# Patient Record
Sex: MALE | Race: WHITE | NOT HISPANIC OR LATINO
[De-identification: names, ages, dates, MRNs, and addresses within clinical notes are randomized per-mention and may not be internally consistent; named-entity substitution may affect disease eponyms.]

---

## 2019-11-20 ENCOUNTER — APPOINTMENT (OUTPATIENT)
Dept: UROLOGY | Facility: CLINIC | Age: 69
End: 2019-11-20
Payer: MEDICARE

## 2019-11-20 VITALS
RESPIRATION RATE: 16 BRPM | WEIGHT: 160 LBS | SYSTOLIC BLOOD PRESSURE: 130 MMHG | BODY MASS INDEX: 22.9 KG/M2 | HEIGHT: 70 IN | HEART RATE: 64 BPM | DIASTOLIC BLOOD PRESSURE: 77 MMHG

## 2019-11-20 DIAGNOSIS — I25.2 OLD MYOCARDIAL INFARCTION: ICD-10-CM

## 2019-11-20 DIAGNOSIS — Z78.9 OTHER SPECIFIED HEALTH STATUS: ICD-10-CM

## 2019-11-20 DIAGNOSIS — Z80.3 FAMILY HISTORY OF MALIGNANT NEOPLASM OF BREAST: ICD-10-CM

## 2019-11-20 PROCEDURE — 99203 OFFICE O/P NEW LOW 30 MIN: CPT

## 2019-11-20 RX ORDER — RAMIPRIL 5 MG/1
CAPSULE ORAL
Refills: 0 | Status: ACTIVE | COMMUNITY

## 2019-11-20 RX ORDER — OMEGA-3/DHA/EPA/FISH OIL 300-1000MG
CAPSULE ORAL
Refills: 0 | Status: ACTIVE | COMMUNITY

## 2019-11-20 RX ORDER — ATORVASTATIN CALCIUM 80 MG/1
TABLET, FILM COATED ORAL
Refills: 0 | Status: ACTIVE | COMMUNITY

## 2019-11-20 RX ORDER — METOPROLOL SUCCINATE 50 MG/1
50 TABLET, EXTENDED RELEASE ORAL
Refills: 0 | Status: ACTIVE | COMMUNITY

## 2019-11-20 RX ORDER — TICAGRELOR 90 MG/1
90 TABLET ORAL
Refills: 0 | Status: ACTIVE | COMMUNITY

## 2019-11-20 NOTE — REVIEW OF SYSTEMS
[Easy Bleeding] : a tendency for easy bleeding [Chest Pain] : chest pain [Negative] : Endocrine [Seen by urologist before (Name)  ___] : Preciously seen by a urologist: [unfilled] [Pain during urination] : pain during urination [History of kidney stones] : history of kidney stones [Blood in urine that you can see] : blood visible in urine [Interrupted urine stream] : interrupted urine stream [Slow urine stream] : slow urine stream [Wake up at night to urinate  How many times?  ___] : wakes up to urinate [unfilled] times during the night

## 2019-11-20 NOTE — PHYSICAL EXAM
[General Appearance - Well Developed] : well developed [Well Groomed] : well groomed [Normal Appearance] : normal appearance [General Appearance - Well Nourished] : well nourished [General Appearance - In No Acute Distress] : no acute distress [Exaggerated Use Of Accessory Muscles For Inspiration] : no accessory muscle use [Respiration, Rhythm And Depth] : normal respiratory rhythm and effort [] : no rash [Normal Station and Gait] : the gait and station were normal for the patient's age [No Focal Deficits] : no focal deficits [Oriented To Time, Place, And Person] : oriented to person, place, and time [Affect] : the affect was normal [Mood] : the mood was normal [Not Anxious] : not anxious

## 2019-11-21 LAB — BACTERIA UR CULT: NORMAL

## 2019-11-25 ENCOUNTER — OUTPATIENT (OUTPATIENT)
Dept: OUTPATIENT SERVICES | Facility: HOSPITAL | Age: 69
LOS: 1 days | End: 2019-11-25
Payer: MEDICARE

## 2019-11-25 ENCOUNTER — TRANSCRIPTION ENCOUNTER (OUTPATIENT)
Age: 69
End: 2019-11-25

## 2019-11-25 VITALS
WEIGHT: 160.06 LBS | TEMPERATURE: 97 F | RESPIRATION RATE: 16 BRPM | HEIGHT: 69 IN | OXYGEN SATURATION: 99 % | SYSTOLIC BLOOD PRESSURE: 138 MMHG | HEART RATE: 68 BPM | DIASTOLIC BLOOD PRESSURE: 82 MMHG

## 2019-11-25 DIAGNOSIS — Z98.1 ARTHRODESIS STATUS: Chronic | ICD-10-CM

## 2019-11-25 DIAGNOSIS — N20.1 CALCULUS OF URETER: ICD-10-CM

## 2019-11-25 DIAGNOSIS — N20.0 CALCULUS OF KIDNEY: ICD-10-CM

## 2019-11-25 DIAGNOSIS — I25.10 ATHEROSCLEROTIC HEART DISEASE OF NATIVE CORONARY ARTERY WITHOUT ANGINA PECTORIS: ICD-10-CM

## 2019-11-25 DIAGNOSIS — Z98.890 OTHER SPECIFIED POSTPROCEDURAL STATES: Chronic | ICD-10-CM

## 2019-11-25 LAB
ANION GAP SERPL CALC-SCNC: 11 MMOL/L — SIGNIFICANT CHANGE UP (ref 5–17)
BUN SERPL-MCNC: 19 MG/DL — SIGNIFICANT CHANGE UP (ref 7–23)
CALCIUM SERPL-MCNC: 9.9 MG/DL — SIGNIFICANT CHANGE UP (ref 8.4–10.5)
CHLORIDE SERPL-SCNC: 105 MMOL/L — SIGNIFICANT CHANGE UP (ref 96–108)
CO2 SERPL-SCNC: 25 MMOL/L — SIGNIFICANT CHANGE UP (ref 22–31)
CREAT SERPL-MCNC: 1.42 MG/DL — HIGH (ref 0.5–1.3)
GLUCOSE SERPL-MCNC: 96 MG/DL — SIGNIFICANT CHANGE UP (ref 70–99)
HCT VFR BLD CALC: 45.1 % — SIGNIFICANT CHANGE UP (ref 39–50)
HGB BLD-MCNC: 14.8 G/DL — SIGNIFICANT CHANGE UP (ref 13–17)
MCHC RBC-ENTMCNC: 30.3 PG — SIGNIFICANT CHANGE UP (ref 27–34)
MCHC RBC-ENTMCNC: 32.8 GM/DL — SIGNIFICANT CHANGE UP (ref 32–36)
MCV RBC AUTO: 92.2 FL — SIGNIFICANT CHANGE UP (ref 80–100)
PLATELET # BLD AUTO: 176 K/UL — SIGNIFICANT CHANGE UP (ref 150–400)
POTASSIUM SERPL-MCNC: 4.3 MMOL/L — SIGNIFICANT CHANGE UP (ref 3.5–5.3)
POTASSIUM SERPL-SCNC: 4.3 MMOL/L — SIGNIFICANT CHANGE UP (ref 3.5–5.3)
RBC # BLD: 4.89 M/UL — SIGNIFICANT CHANGE UP (ref 4.2–5.8)
RBC # FLD: 13.2 % — SIGNIFICANT CHANGE UP (ref 10.3–14.5)
SODIUM SERPL-SCNC: 141 MMOL/L — SIGNIFICANT CHANGE UP (ref 135–145)
WBC # BLD: 6.38 K/UL — SIGNIFICANT CHANGE UP (ref 3.8–10.5)
WBC # FLD AUTO: 6.38 K/UL — SIGNIFICANT CHANGE UP (ref 3.8–10.5)

## 2019-11-25 RX ORDER — CEFAZOLIN SODIUM 1 G
2000 VIAL (EA) INJECTION ONCE
Refills: 0 | Status: DISCONTINUED | OUTPATIENT
Start: 2019-12-02 | End: 2019-12-18

## 2019-11-25 NOTE — H&P PST ADULT - NEGATIVE ENMT SYMPTOMS
no nasal congestion/no nasal discharge/no throat pain no hearing difficulty/no tinnitus/no throat pain/no vertigo/no sinus symptoms/no nasal congestion/no nasal discharge/no ear pain

## 2019-11-25 NOTE — H&P PST ADULT - NSICDXPROBLEM_GEN_ALL_CORE_FT
PROBLEM DIAGNOSES  Problem: Ureteral stone  Assessment and Plan: Cystoscopy, Right Stent Exchange, Right Ureteroscopy with Laser Stone Removal, Possible Endoureterotomy  Pre-op instructions provided - all questions answered    Problem: CAD (coronary artery disease)  Assessment and Plan: Pt. will continue aspirin during george-op period, hold Brillinta one week before. Continue BP meds as directed, including am of surgery with sip of water

## 2019-11-25 NOTE — H&P PST ADULT - NSICDXPASTMEDICALHX_GEN_ALL_CORE_FT
PAST MEDICAL HISTORY:  Hyperlipemia     Hypertension     Myocardial infarction 2/2019 PAST MEDICAL HISTORY:  Hyperlipemia     Hypertension     Myocardial infarction 2/2019    Ureteral stone right    Ureteral stricture right

## 2019-11-25 NOTE — ASSESSMENT
[FreeTextEntry1] : KUB: ~ 5 mm right proximal ureteral stone, right stent in place\par \par I had long discussion with patient about their stones, and about options, risks, and benefits of all treatments.  Main options for definitive stone treatment include ESWL, URS, PCNL.  \par \par ESWL success best with smaller, less dense stones, and with short skin to stone distance and favorable location of the stone within the urinary tract, while URS is more successful treatment with multiple stones, more dense stones, or challenging body habitus or stone location.  PCNL is best option for larger, more dense and complex stones, and particularly those involving the lower pole.  Non-definitive stone treatment options for drainage, using either stents or nephrostomy, also reviewed: these are of lower immediate surgical risks, but incur multiple procedures to manage and may have their own complications and effects on quality of life.  Still, nephrostomy or nephroureteral catheter can allow maintenance of urinary system drainage without surgical risks, and management in office with exchanges (avoiding the anesthesia and testing which would be present with bilateral internal stent exchange).\par \par Risks of nontreatment with obstruction can lead to very high rate of renal function loss in stone-bearing kidney over the next months to years.\par \par In this patient's case, I recommend...right uRS with laser, stone removal, possible endopyelotomy, stent exchange\par \par Risks/benefits/success/recovery expectations all reviewed at length, particularly with respect to patient's comorbidities, and inclusive of infection/sepsis, bleeding, need for secondary procedures or secondary stages such as embolization or open surgery, and even risks of death due to acute issues superimposed on comorbidities.\par \par Pt prefers to undergo: right URS with laser, stone removal, possible endopyelotomy\par \par Will schedule.\par \par Urine culture, PST appt to schedule once surgery scheduled.\par

## 2019-11-25 NOTE — H&P PST ADULT - HISTORY OF PRESENT ILLNESS
68 yo male, PMH  9/27/19 - unable to remove with stent placement,   5/2019 low back pain, x-ray ok, blood in urine - still hematuria 68 yo male, PMH CAD MI 2/2019, cardiac stents 2015, 2017 and 2/2019 (2), HLD, lumbar spine stenosis s/p lumbar fusion X 2. Pt. reports intermittent low back pain since June, saw his spine MD and x-rays looked ok, in September  he developed hematuria and right flank pain, went to [facility] and CT scan revealed ureteral stone and stricture, stent was placed but unable to remove stone. Pt. presents to [facility] for scheduled Cystoscopy, Right Stent Exchange, Right Ureteroscopy with Laser Stone Removal, Possible Endoureterotomy on 12/2/19. Pt. remains with hematuria, RLQ, flank and back discomfort, denies recent fever, chills, chest pain, SOB, changes in bowel/urinary habits.   Pt. has appointment to see cardiologist tomorrow - requested last cardiac cath report if available  Urine culture 11/20/19 in chart  CBC/BMP done at [facility]

## 2019-11-25 NOTE — H&P PST ADULT - NSICDXPASTSURGICALHX_GEN_ALL_CORE_FT
PAST SURGICAL HISTORY:  S/P cardiac cath s/p 5/2000 (1), 5/2006 (1), 2/2019 (2) - total of 4 stents    S/P lumbar fusion 10/2015, 10/2017

## 2019-11-25 NOTE — HISTORY OF PRESENT ILLNESS
[Currently Experiencing ___] :  [unfilled] [FreeTextEntry1] : Pt is 68 yo male with right ureteral stone, possible right ureteral stricture.  S/p attempted ureteroscopy with stent placement by Dr. Jasso.\par \par Here today, moderate stent symptoms.  Urine relatively clear.\par \par Currently on Brillinta, s/p MI 2/2019\par PMH:L CAD< cholesterol, stone\par PSH: cardiac stents 2/2019 back surgery fusion x 2 \par Meds: ramipril, brillinta, atorvastatin, metoprolol, asa, magnesium\par Allergies: fish\par

## 2019-11-25 NOTE — LETTER BODY
[Dear  ___] : Dear  [unfilled], [Consult Letter:] : I had the pleasure of evaluating your patient, [unfilled]. [( Thank you for referring [unfilled] for consultation for _____ )] : Thank you for referring [unfilled] for consultation for [unfilled] [Sincerely,] : Sincerely, [Please see my note below.] : Please see my note below. [Consult Closing:] : Thank you very much for allowing me to participate in the care of this patient.  If you have any questions, please do not hesitate to contact me. [FreeTextEntry1] : Pt is 70 yo male with right ureteral stone, possible right ureteral stricture.  S/p attempted ureteroscopy with stent placement, referred for management.\par \par Here today, moderate stent symptoms.  Urine relatively clear.\par \par Currently on Brillinta, s/p MI 2/2019\par PMH:L CAD< cholesterol, stone\par PSH: cardiac stents 2/2019 back surgery fusion x 2 \par Meds: ramipril, brillinta, atorvastatin, metoprolol, asa, magnesium\par Allergies: fish\par \par KUB: ~ 5 mm right proximal ureteral stone, right stent in place\par \par I had long discussion with patient about their stones, and about options, risks, and benefits of all treatments.  Main options for definitive stone treatment include ESWL, URS, PCNL.  \par \par ESWL success best with smaller, less dense stones, and with short skin to stone distance and favorable location of the stone within the urinary tract, while URS is more successful treatment with multiple stones, more dense stones, or challenging body habitus or stone location.  PCNL is best option for larger, more dense and complex stones, and particularly those involving the lower pole.  \par \par In this patient's case, I recommend...right URS with laser, stone removal, possible endopyelotomy, stent exchange- given the stent placement, I would expect being ablet to reach the stone without having to worry about AC limitations with ESWL or percutaneous approach.\par \par Risks/benefits/success/recovery expectations all reviewed at length, particularly with respect to patient's comorbidities, and inclusive of infection/sepsis, bleeding, need for secondary procedures or secondary stages such as embolization or open surgery, and even risks of death due to acute issues superimposed on comorbidities.\par \par Pt prefers to undergo: right URS with laser, stone removal, possible endopyelotomy\par \par Will schedule.\par \par Urine culture, PST appt to schedule once surgery scheduled.\par

## 2019-12-01 ENCOUNTER — TRANSCRIPTION ENCOUNTER (OUTPATIENT)
Age: 69
End: 2019-12-01

## 2019-12-02 ENCOUNTER — APPOINTMENT (OUTPATIENT)
Dept: UROLOGY | Facility: HOSPITAL | Age: 69
End: 2019-12-02

## 2019-12-02 ENCOUNTER — OUTPATIENT (OUTPATIENT)
Dept: OUTPATIENT SERVICES | Facility: HOSPITAL | Age: 69
LOS: 1 days | End: 2019-12-02
Payer: MEDICARE

## 2019-12-02 ENCOUNTER — RESULT REVIEW (OUTPATIENT)
Age: 69
End: 2019-12-02

## 2019-12-02 VITALS
TEMPERATURE: 98 F | WEIGHT: 160.06 LBS | HEIGHT: 69 IN | HEART RATE: 71 BPM | SYSTOLIC BLOOD PRESSURE: 138 MMHG | DIASTOLIC BLOOD PRESSURE: 80 MMHG | RESPIRATION RATE: 16 BRPM | OXYGEN SATURATION: 99 %

## 2019-12-02 VITALS
DIASTOLIC BLOOD PRESSURE: 83 MMHG | RESPIRATION RATE: 18 BRPM | TEMPERATURE: 98 F | OXYGEN SATURATION: 100 % | HEART RATE: 82 BPM | SYSTOLIC BLOOD PRESSURE: 161 MMHG

## 2019-12-02 DIAGNOSIS — N20.0 CALCULUS OF KIDNEY: ICD-10-CM

## 2019-12-02 DIAGNOSIS — Z98.1 ARTHRODESIS STATUS: Chronic | ICD-10-CM

## 2019-12-02 DIAGNOSIS — Z98.890 OTHER SPECIFIED POSTPROCEDURAL STATES: Chronic | ICD-10-CM

## 2019-12-02 PROCEDURE — C1758: CPT

## 2019-12-02 PROCEDURE — 82365 CALCULUS SPECTROSCOPY: CPT

## 2019-12-02 PROCEDURE — 52352 CYSTOURETERO W/STONE REMOVE: CPT | Mod: RT

## 2019-12-02 PROCEDURE — 76000 FLUOROSCOPY <1 HR PHYS/QHP: CPT

## 2019-12-02 PROCEDURE — 85027 COMPLETE CBC AUTOMATED: CPT

## 2019-12-02 PROCEDURE — 88300 SURGICAL PATH GROSS: CPT | Mod: 26

## 2019-12-02 PROCEDURE — C1889: CPT

## 2019-12-02 PROCEDURE — C1769: CPT

## 2019-12-02 PROCEDURE — 88300 SURGICAL PATH GROSS: CPT

## 2019-12-02 PROCEDURE — 80048 BASIC METABOLIC PNL TOTAL CA: CPT

## 2019-12-02 PROCEDURE — 74420 UROGRAPHY RTRGR +-KUB: CPT | Mod: 26

## 2019-12-02 PROCEDURE — G0463: CPT

## 2019-12-02 RX ORDER — ACETAMINOPHEN WITH CODEINE 300MG-30MG
1 TABLET ORAL
Qty: 15 | Refills: 0
Start: 2019-12-02 | End: 2019-12-06

## 2019-12-02 RX ORDER — LIDOCAINE HCL 20 MG/ML
0.2 VIAL (ML) INJECTION ONCE
Refills: 0 | Status: DISCONTINUED | OUTPATIENT
Start: 2019-12-02 | End: 2019-12-02

## 2019-12-02 RX ORDER — ONDANSETRON 8 MG/1
4 TABLET, FILM COATED ORAL ONCE
Refills: 0 | Status: COMPLETED | OUTPATIENT
Start: 2019-12-02 | End: 2019-12-02

## 2019-12-02 RX ORDER — SODIUM CHLORIDE 9 MG/ML
3 INJECTION INTRAMUSCULAR; INTRAVENOUS; SUBCUTANEOUS EVERY 8 HOURS
Refills: 0 | Status: DISCONTINUED | OUTPATIENT
Start: 2019-12-02 | End: 2019-12-02

## 2019-12-02 RX ORDER — HYDROMORPHONE HYDROCHLORIDE 2 MG/ML
0.25 INJECTION INTRAMUSCULAR; INTRAVENOUS; SUBCUTANEOUS
Refills: 0 | Status: DISCONTINUED | OUTPATIENT
Start: 2019-12-02 | End: 2019-12-02

## 2019-12-02 RX ORDER — TAMSULOSIN HYDROCHLORIDE 0.4 MG/1
1 CAPSULE ORAL
Qty: 15 | Refills: 0
Start: 2019-12-02 | End: 2019-12-16

## 2019-12-02 RX ADMIN — HYDROMORPHONE HYDROCHLORIDE 0.25 MILLIGRAM(S): 2 INJECTION INTRAMUSCULAR; INTRAVENOUS; SUBCUTANEOUS at 18:53

## 2019-12-02 RX ADMIN — HYDROMORPHONE HYDROCHLORIDE 0.25 MILLIGRAM(S): 2 INJECTION INTRAMUSCULAR; INTRAVENOUS; SUBCUTANEOUS at 18:58

## 2019-12-02 RX ADMIN — HYDROMORPHONE HYDROCHLORIDE 0.25 MILLIGRAM(S): 2 INJECTION INTRAMUSCULAR; INTRAVENOUS; SUBCUTANEOUS at 15:35

## 2019-12-02 RX ADMIN — ONDANSETRON 4 MILLIGRAM(S): 8 TABLET, FILM COATED ORAL at 15:34

## 2019-12-02 NOTE — ASU DISCHARGE PLAN (ADULT/PEDIATRIC) - CARE PROVIDER_API CALL
Hoenig, David M (MD)  Urology  [facility] Dept of Urology, [address] [place] [address]  Phone: [phone]  Fax: [phone]  Follow Up Time: 2 weeks

## 2019-12-02 NOTE — ASU PATIENT PROFILE, ADULT - PMH
Hyperlipemia    Hypertension    Myocardial infarction  2/2019  Ureteral stone  right  Ureteral stricture  right

## 2019-12-02 NOTE — ASU PREOP CHECKLIST - SURGICAL CONSENT
What Type Of Note Output Would You Prefer (Optional)?: Standard Output How Severe Is Your Rash?: severe Is This A New Presentation, Or A Follow-Up?: Rash Additional History: Primary care did blood test and showed it confirmed valley fever done

## 2019-12-02 NOTE — ASU PATIENT PROFILE, ADULT - PSH
S/P cardiac cath  s/p 5/2000 (1), 5/2006 (1), 2/2019 (2) - total of 4 stents  S/P lumbar fusion  10/2015, 10/2017

## 2019-12-02 NOTE — BRIEF OPERATIVE NOTE - NSICDXBRIEFPROCEDURE_GEN_ALL_CORE_FT
PROCEDURES:  Ureteroscopy with laser lithotripsy and stent placement 02-Dec-2019 14:41:20  Gurmeet Collazo

## 2019-12-03 PROBLEM — I21.9 ACUTE MYOCARDIAL INFARCTION, UNSPECIFIED: Chronic | Status: ACTIVE | Noted: 2019-11-25

## 2019-12-03 PROBLEM — N13.5 CROSSING VESSEL AND STRICTURE OF URETER WITHOUT HYDRONEPHROSIS: Chronic | Status: ACTIVE | Noted: 2019-11-25

## 2019-12-03 PROBLEM — E78.5 HYPERLIPIDEMIA, UNSPECIFIED: Chronic | Status: ACTIVE | Noted: 2019-11-25

## 2019-12-03 PROBLEM — N20.1 CALCULUS OF URETER: Chronic | Status: ACTIVE | Noted: 2019-11-25

## 2019-12-03 PROBLEM — I10 ESSENTIAL (PRIMARY) HYPERTENSION: Chronic | Status: ACTIVE | Noted: 2019-11-25

## 2019-12-05 LAB — NIDUS STONE QN: SIGNIFICANT CHANGE UP

## 2019-12-13 NOTE — H&P PST ADULT - NSANTHGENDERRD_ENT_A_CORE
[address] Patient Status:  No patient class for patient encounter    3/8/1934 MRN [id number]   Location MD Dr. Tori Rodriguez is a 78year old male wh legs than normal. Breathing okay. Doing stairs at home. Denies dizziness, lightheadedness, chest pain and palpitations. Denies respiratory complaints- no KRAMER, SOB, orthopnea, PND.             Review of Systems:  Constitutional: negative for fatigue, chills 25.99 kg/m²       D'c weight 138 ///           137                 140 ///  134  Clinic weights: 1) 147 /// 2) 138 3) 137/// 3) 138 4) 136 /// 5) 135 6) 133 7) 131 8) 126 9) 132 10) 133 11) 132 12) 132 13) 132 14) 133 15) 132 16) 133 17) 133 18) 133 /// 19 down. May need increased dose of torsemide to 20 mg once daily or 10 mg alter anting with 20 mg daily (old doses of medications. )  - Per family, goal weight ~128 lbs as established by Dr Daksha Pearl. Now 133 lbs.  After hospitalization   -  Trace lower extremity 2000 mg salt/sodium daily.  Common high sodium foods include frozen dinners, soups (not homemade), some cereal, vegetable juice, canned vegetables, lunch meats, processed meats like hotdogs, sausage, zarco, pepperoni, soy sauce, pre-packaged rice or potatoe Yes

## 2019-12-17 ENCOUNTER — APPOINTMENT (OUTPATIENT)
Dept: UROLOGY | Facility: CLINIC | Age: 69
End: 2019-12-17
Payer: MEDICARE

## 2019-12-17 PROCEDURE — 99214 OFFICE O/P EST MOD 30 MIN: CPT

## 2019-12-17 NOTE — PHYSICAL EXAM
[General Appearance - Well Developed] : well developed [General Appearance - Well Nourished] : well nourished [Normal Appearance] : normal appearance [General Appearance - In No Acute Distress] : no acute distress [Well Groomed] : well groomed [Respiration, Rhythm And Depth] : normal respiratory rhythm and effort [] : no respiratory distress [Exaggerated Use Of Accessory Muscles For Inspiration] : no accessory muscle use [Oriented To Time, Place, And Person] : oriented to person, place, and time [Affect] : the affect was normal [Mood] : the mood was normal [Not Anxious] : not anxious [Normal Station and Gait] : the gait and station were normal for the patient's age

## 2019-12-17 NOTE — HISTORY OF PRESENT ILLNESS
[FreeTextEntry1] : Pt returns for metabolic evaluation and prevention of future stone disease following recent surgery for stone.  At issue today is review of the stone analysis, risk factors for future stone episodes and establishing whether they have pure dietary, metabolic, or mixed causes for their stone risks which is unrelated to the surgical management of their stone disease.\par \par They underwent right URS with stent removal, stone removal of right ureteral stone, right renal stone\par \par Stent status: none left after procedure\par \par Stone analysis: 90% Calcium oxalate monohydrate 10% Calcium oxalate dihydrate \par \par Feeling well today- no pain, back to normal.\par  [None] : no symptoms

## 2019-12-17 NOTE — LETTER BODY
[Dear  ___] : Dear  [unfilled], [Consult Letter:] : I had the pleasure of evaluating your patient, [unfilled]. [( Thank you for referring [unfilled] for consultation for _____ )] : Thank you for referring [unfilled] for consultation for [unfilled] [Please see my note below.] : Please see my note below. [Consult Closing:] : Thank you very much for allowing me to participate in the care of this patient.  If you have any questions, please do not hesitate to contact me. [Sincerely,] : Sincerely, [FreeTextEntry1] : Pt is 68 yo male with right ureteral stone, possible right ureteral stricture in whom you attempted ureteroscopy but couldn't pass an area of narrowing.  Stent placed.  He returns today s/p right URS with stone removal ureter and kidney- no evidence of stricture.\par \par Pt returns for metabolic evaluation and prevention of future stone disease following recent surgery for stone.  At issue today is review of the stone analysis, risk factors for future stone episodes and establishing whether they have pure dietary, metabolic, or mixed causes for their stone risks which is unrelated to the surgical management of their stone disease.\par \par They underwent right URS with stent removal, stone removal of right ureteral stone, right renal stone\par \par Stent status: none left after procedure\par \par Stone analysis: 90% Calcium oxalate monohydrate 10% Calcium oxalate dihydrate \par \par Feeling well today- no pain, back to normal.\par \par Diet modification reviewed at length- increasing fluids (primarily water), citrus is good, and decreasing/moderating salt, animal flesh protein, oxalate containing foods, and moderation of calcium intake (1000 mg/day is USRDA).\par \par I reviewed with the patient the risks of metabolic stone disease given their underlying risk parameters (all of which include large stones, multiple stones, bilateral stones, family history, and young age), and the indications for 24 hour urine metabolic assessment.\par \par We also discussed benefits of regular exercise and weight loss as independent risk reducers for stones.\par \par 1. Diet modification as discussed\par 2. 24 hour urine collection x 2 in the next few weeks\par 3. RTC with renal US in 8 to 10 weeks\par  [DrConnor  ___] : Dr. FOX

## 2019-12-17 NOTE — ASSESSMENT
[FreeTextEntry1] : Diet modification reviewed at length- increasing fluids (primarily water), citrus is good, and decreasing/moderating salt, animal flesh protein, oxalate containing foods, and moderation of calcium intake (1000 mg/day is USRDA).\par \par I reviewed with the patient the risks of metabolic stone disease given their underlying risk parameters (all of which include large stones, multiple stones, bilateral stones, family history, and young age), and the indications for 24 hour urine metabolic assessment.\par \par We also discussed benefits of regular exercise and weight loss as independent risk reducers for stones.\par \par 1. Diet modification as discussed\par 2. 24 hour urine collection x 2 in the next few weeks\par 3. RTC with renal US in 8 to 10 weeks\par

## 2019-12-26 LAB — SURGICAL PATHOLOGY STUDY: SIGNIFICANT CHANGE UP

## 2019-12-30 ENCOUNTER — INPATIENT (INPATIENT)
Facility: HOSPITAL | Age: 69
LOS: 2 days | Discharge: HOME | DRG: 540 | End: 2020-01-02
Attending: INTERNAL MEDICINE | Admitting: INTERNAL MEDICINE
Payer: MEDICARE

## 2019-12-30 VITALS
TEMPERATURE: 98 F | WEIGHT: 154.98 LBS | DIASTOLIC BLOOD PRESSURE: 89 MMHG | RESPIRATION RATE: 18 BRPM | HEIGHT: 70 IN | OXYGEN SATURATION: 99 % | SYSTOLIC BLOOD PRESSURE: 171 MMHG | HEART RATE: 74 BPM

## 2019-12-30 DIAGNOSIS — M46.26 OSTEOMYELITIS OF VERTEBRA, LUMBAR REGION: ICD-10-CM

## 2019-12-30 DIAGNOSIS — Z98.890 OTHER SPECIFIED POSTPROCEDURAL STATES: Chronic | ICD-10-CM

## 2019-12-30 DIAGNOSIS — I10 ESSENTIAL (PRIMARY) HYPERTENSION: ICD-10-CM

## 2019-12-30 DIAGNOSIS — Z98.1 ARTHRODESIS STATUS: Chronic | ICD-10-CM

## 2019-12-30 DIAGNOSIS — E78.5 HYPERLIPIDEMIA, UNSPECIFIED: ICD-10-CM

## 2019-12-30 DIAGNOSIS — I25.10 ATHEROSCLEROTIC HEART DISEASE OF NATIVE CORONARY ARTERY WITHOUT ANGINA PECTORIS: ICD-10-CM

## 2019-12-30 DIAGNOSIS — N20.1 CALCULUS OF URETER: ICD-10-CM

## 2019-12-30 LAB
ALBUMIN SERPL ELPH-MCNC: 4.5 G/DL — SIGNIFICANT CHANGE UP (ref 3.3–5)
ALP SERPL-CCNC: 74 U/L — SIGNIFICANT CHANGE UP (ref 40–120)
ALT FLD-CCNC: 33 U/L — SIGNIFICANT CHANGE UP (ref 10–45)
ANION GAP SERPL CALC-SCNC: 11 MMOL/L — SIGNIFICANT CHANGE UP (ref 5–17)
APPEARANCE UR: CLEAR — SIGNIFICANT CHANGE UP
AST SERPL-CCNC: 28 U/L — SIGNIFICANT CHANGE UP (ref 10–40)
BACTERIA # UR AUTO: NEGATIVE — SIGNIFICANT CHANGE UP
BASOPHILS # BLD AUTO: 0 K/UL — SIGNIFICANT CHANGE UP (ref 0–0.2)
BASOPHILS NFR BLD AUTO: 0 % — SIGNIFICANT CHANGE UP (ref 0–2)
BILIRUB SERPL-MCNC: 0.7 MG/DL — SIGNIFICANT CHANGE UP (ref 0.2–1.2)
BILIRUB UR-MCNC: NEGATIVE — SIGNIFICANT CHANGE UP
BUN SERPL-MCNC: 19 MG/DL — SIGNIFICANT CHANGE UP (ref 7–23)
CALCIUM SERPL-MCNC: 9.6 MG/DL — SIGNIFICANT CHANGE UP (ref 8.4–10.5)
CHLORIDE SERPL-SCNC: 101 MMOL/L — SIGNIFICANT CHANGE UP (ref 96–108)
CO2 SERPL-SCNC: 25 MMOL/L — SIGNIFICANT CHANGE UP (ref 22–31)
COLOR SPEC: YELLOW — SIGNIFICANT CHANGE UP
CREAT SERPL-MCNC: 1.35 MG/DL — HIGH (ref 0.5–1.3)
DIFF PNL FLD: NEGATIVE — SIGNIFICANT CHANGE UP
EOSINOPHIL # BLD AUTO: 0.14 K/UL — SIGNIFICANT CHANGE UP (ref 0–0.5)
EOSINOPHIL NFR BLD AUTO: 2.9 % — SIGNIFICANT CHANGE UP (ref 0–6)
EPI CELLS # UR: 0 /HPF — SIGNIFICANT CHANGE UP
ERYTHROCYTE [SEDIMENTATION RATE] IN BLOOD: 5 MM/HR — SIGNIFICANT CHANGE UP (ref 0–20)
GAS PNL BLDV: SIGNIFICANT CHANGE UP
GLUCOSE SERPL-MCNC: 98 MG/DL — SIGNIFICANT CHANGE UP (ref 70–99)
GLUCOSE UR QL: NEGATIVE — SIGNIFICANT CHANGE UP
HCT VFR BLD CALC: 46.2 % — SIGNIFICANT CHANGE UP (ref 39–50)
HGB BLD-MCNC: 15.1 G/DL — SIGNIFICANT CHANGE UP (ref 13–17)
HYALINE CASTS # UR AUTO: 1 /LPF — SIGNIFICANT CHANGE UP (ref 0–2)
IMM GRANULOCYTES NFR BLD AUTO: 0.4 % — SIGNIFICANT CHANGE UP (ref 0–1.5)
KETONES UR-MCNC: NEGATIVE — SIGNIFICANT CHANGE UP
LEUKOCYTE ESTERASE UR-ACNC: NEGATIVE — SIGNIFICANT CHANGE UP
LYMPHOCYTES # BLD AUTO: 1.45 K/UL — SIGNIFICANT CHANGE UP (ref 1–3.3)
LYMPHOCYTES # BLD AUTO: 29.9 % — SIGNIFICANT CHANGE UP (ref 13–44)
MCHC RBC-ENTMCNC: 30 PG — SIGNIFICANT CHANGE UP (ref 27–34)
MCHC RBC-ENTMCNC: 32.7 GM/DL — SIGNIFICANT CHANGE UP (ref 32–36)
MCV RBC AUTO: 91.8 FL — SIGNIFICANT CHANGE UP (ref 80–100)
MONOCYTES # BLD AUTO: 0.57 K/UL — SIGNIFICANT CHANGE UP (ref 0–0.9)
MONOCYTES NFR BLD AUTO: 11.8 % — SIGNIFICANT CHANGE UP (ref 2–14)
NEUTROPHILS # BLD AUTO: 2.67 K/UL — SIGNIFICANT CHANGE UP (ref 1.8–7.4)
NEUTROPHILS NFR BLD AUTO: 55 % — SIGNIFICANT CHANGE UP (ref 43–77)
NITRITE UR-MCNC: NEGATIVE — SIGNIFICANT CHANGE UP
NRBC # BLD: 0 /100 WBCS — SIGNIFICANT CHANGE UP (ref 0–0)
PH UR: 6 — SIGNIFICANT CHANGE UP (ref 5–8)
PLATELET # BLD AUTO: 140 K/UL — LOW (ref 150–400)
POTASSIUM SERPL-MCNC: 3.8 MMOL/L — SIGNIFICANT CHANGE UP (ref 3.5–5.3)
POTASSIUM SERPL-SCNC: 3.8 MMOL/L — SIGNIFICANT CHANGE UP (ref 3.5–5.3)
PROT SERPL-MCNC: 7.5 G/DL — SIGNIFICANT CHANGE UP (ref 6–8.3)
PROT UR-MCNC: NEGATIVE — SIGNIFICANT CHANGE UP
RBC # BLD: 5.03 M/UL — SIGNIFICANT CHANGE UP (ref 4.2–5.8)
RBC # FLD: 12.8 % — SIGNIFICANT CHANGE UP (ref 10.3–14.5)
RBC CASTS # UR COMP ASSIST: 2 /HPF — SIGNIFICANT CHANGE UP (ref 0–4)
SODIUM SERPL-SCNC: 137 MMOL/L — SIGNIFICANT CHANGE UP (ref 135–145)
SP GR SPEC: 1.02 — SIGNIFICANT CHANGE UP (ref 1.01–1.02)
UROBILINOGEN FLD QL: NEGATIVE — SIGNIFICANT CHANGE UP
WBC # BLD: 4.85 K/UL — SIGNIFICANT CHANGE UP (ref 3.8–10.5)
WBC # FLD AUTO: 4.85 K/UL — SIGNIFICANT CHANGE UP (ref 3.8–10.5)
WBC UR QL: 1 /HPF — SIGNIFICANT CHANGE UP (ref 0–5)

## 2019-12-30 PROCEDURE — 99285 EMERGENCY DEPT VISIT HI MDM: CPT

## 2019-12-30 PROCEDURE — 74176 CT ABD & PELVIS W/O CONTRAST: CPT | Mod: 26

## 2019-12-30 RX ORDER — ACETAMINOPHEN 500 MG
1 TABLET ORAL
Qty: 0 | Refills: 0 | DISCHARGE

## 2019-12-30 RX ORDER — ATORVASTATIN CALCIUM 80 MG/1
80 TABLET, FILM COATED ORAL AT BEDTIME
Refills: 0 | Status: DISCONTINUED | OUTPATIENT
Start: 2019-12-30 | End: 2020-01-02

## 2019-12-30 RX ORDER — TICAGRELOR 90 MG/1
1 TABLET ORAL
Qty: 0 | Refills: 0 | DISCHARGE

## 2019-12-30 RX ORDER — METOPROLOL TARTRATE 50 MG
1 TABLET ORAL
Qty: 0 | Refills: 0 | DISCHARGE

## 2019-12-30 RX ORDER — ACETAMINOPHEN 500 MG
1000 TABLET ORAL EVERY 6 HOURS
Refills: 0 | Status: DISCONTINUED | OUTPATIENT
Start: 2019-12-30 | End: 2020-01-02

## 2019-12-30 RX ORDER — ATORVASTATIN CALCIUM 80 MG/1
1 TABLET, FILM COATED ORAL
Qty: 0 | Refills: 0 | DISCHARGE

## 2019-12-30 RX ORDER — SODIUM CHLORIDE 9 MG/ML
1000 INJECTION INTRAMUSCULAR; INTRAVENOUS; SUBCUTANEOUS ONCE
Refills: 0 | Status: COMPLETED | OUTPATIENT
Start: 2019-12-30 | End: 2019-12-30

## 2019-12-30 RX ORDER — VANCOMYCIN HCL 1 G
1000 VIAL (EA) INTRAVENOUS ONCE
Refills: 0 | Status: COMPLETED | OUTPATIENT
Start: 2019-12-30 | End: 2019-12-30

## 2019-12-30 RX ORDER — ASPIRIN/CALCIUM CARB/MAGNESIUM 324 MG
1 TABLET ORAL
Qty: 0 | Refills: 0 | DISCHARGE

## 2019-12-30 RX ORDER — MORPHINE SULFATE 50 MG/1
4 CAPSULE, EXTENDED RELEASE ORAL ONCE
Refills: 0 | Status: DISCONTINUED | OUTPATIENT
Start: 2019-12-30 | End: 2019-12-30

## 2019-12-30 RX ORDER — METOPROLOL TARTRATE 50 MG
50 TABLET ORAL DAILY
Refills: 0 | Status: DISCONTINUED | OUTPATIENT
Start: 2019-12-30 | End: 2020-01-02

## 2019-12-30 RX ORDER — NITROGLYCERIN 6.5 MG
1 CAPSULE, EXTENDED RELEASE ORAL
Qty: 0 | Refills: 0 | DISCHARGE

## 2019-12-30 RX ORDER — NITROGLYCERIN 6.5 MG
0.4 CAPSULE, EXTENDED RELEASE ORAL
Refills: 0 | Status: DISCONTINUED | OUTPATIENT
Start: 2019-12-30 | End: 2020-01-02

## 2019-12-30 RX ORDER — ONDANSETRON 8 MG/1
4 TABLET, FILM COATED ORAL ONCE
Refills: 0 | Status: COMPLETED | OUTPATIENT
Start: 2019-12-30 | End: 2019-12-30

## 2019-12-30 RX ORDER — RAMIPRIL 5 MG
1 CAPSULE ORAL
Qty: 0 | Refills: 0 | DISCHARGE

## 2019-12-30 RX ORDER — TAMSULOSIN HYDROCHLORIDE 0.4 MG/1
0.4 CAPSULE ORAL ONCE
Refills: 0 | Status: COMPLETED | OUTPATIENT
Start: 2019-12-30 | End: 2019-12-30

## 2019-12-30 RX ORDER — LISINOPRIL 2.5 MG/1
5 TABLET ORAL DAILY
Refills: 0 | Status: DISCONTINUED | OUTPATIENT
Start: 2019-12-30 | End: 2020-01-02

## 2019-12-30 RX ADMIN — SODIUM CHLORIDE 1000 MILLILITER(S): 9 INJECTION INTRAMUSCULAR; INTRAVENOUS; SUBCUTANEOUS at 13:25

## 2019-12-30 RX ADMIN — MORPHINE SULFATE 4 MILLIGRAM(S): 50 CAPSULE, EXTENDED RELEASE ORAL at 15:22

## 2019-12-30 RX ADMIN — MORPHINE SULFATE 4 MILLIGRAM(S): 50 CAPSULE, EXTENDED RELEASE ORAL at 20:08

## 2019-12-30 RX ADMIN — Medication 250 MILLIGRAM(S): at 18:38

## 2019-12-30 RX ADMIN — ATORVASTATIN CALCIUM 80 MILLIGRAM(S): 80 TABLET, FILM COATED ORAL at 22:10

## 2019-12-30 RX ADMIN — Medication 1000 MILLIGRAM(S): at 22:10

## 2019-12-30 RX ADMIN — ONDANSETRON 4 MILLIGRAM(S): 8 TABLET, FILM COATED ORAL at 15:22

## 2019-12-30 RX ADMIN — Medication 1000 MILLIGRAM(S): at 23:10

## 2019-12-30 RX ADMIN — TAMSULOSIN HYDROCHLORIDE 0.4 MILLIGRAM(S): 0.4 CAPSULE ORAL at 13:25

## 2019-12-30 NOTE — ED ADULT NURSE REASSESSMENT NOTE - NS ED NURSE REASSESS COMMENT FT1
no change in baseline mental status.  no distress noted at this time.  vss.  pt awaiting admitting bed.  will continue to monitor.

## 2019-12-30 NOTE — ED PROVIDER NOTE - ATTENDING CONTRIBUTION TO CARE
I have seen and evaluated this patient with the advance practice clinician.   I agree with the findings  unless other wise stated. I have amended notes where needed.  After my face to face bedside evaluation, I am notin yo male with PMHx of HTN, HLD, MI on 2019 on Brilinta, kidney stones, kidney stone removal surgery on  by Dr. Hoenig (with no complications, no stent in place) s/p cardiac cath, s/p lumbar fusion, presents with right sided flank pain for 1 week described as shooting and stabbing pain. Patient also reports slow stream when voiding. Patient states his pain feels as though he has a stent in place. Patient denies fever, chills, nausea, vomiting, hematuria, dysuria, diarrhea, or constipation. Alert no distress afebrile abdomen soft discomfort tight CVA palpation no masses No hernia no rash Back midline surgical scar in lumbar area no distal neuro vascular deficit clear lungs concern for right back pain /2 renal colic v/s radiculopathy labs MRI disc eval by radiologist re eval --Jones

## 2019-12-30 NOTE — ED PROVIDER NOTE - CLINICAL SUMMARY MEDICAL DECISION MAKING FREE TEXT BOX
PA NOTE: Patient presented with right flank pain, right back pain x 5 days. CT NEG for stones. Outpatient MRI with abnormal signal at L1-L2 level, concerning for Osteomyelitis. Paged neurosurgery. Admitted to medicine Dr. Conrad. Yasiro x 1 given in ED. Septic labs sent. ~MONICA Pulido

## 2019-12-30 NOTE — H&P ADULT - HISTORY OF PRESENT ILLNESS
Patient is a 69 year old male with PMH of HLD, HTN, MI, Lumbar spinal fusion, right sided kidney stones with stent from 9/2019 to 12/2019, s/p stone and stent removal by Dr. Hoenig on 12/2/2019 presents to the Emergency Department  c/o intermittent severe right sided back and right flank pain x 5 days. Denies nausea, vomiting diarrhea, hematuria, fever, chills. As per wife, patient was in so much pain last night that he may have passed out for 2 seconds. No trauma/injuries. Patient first thought it was his back pain and had an MRI L-spine done 3 days ago, brought CD with him to the Emergency Department which as per the Emergency Department provider shows possible discitis/ osteomyelitis L1 L2. Admitted for management

## 2019-12-30 NOTE — CONSULT NOTE ADULT - ASSESSMENT
Jayme Shore  69M w/ pmh of recent kidney stone removal/ stent, and multiple lumbar spine surgeries all at [place], L2-S1 fusion, L2/3 interbody pw R flank pain for 5 days. No radiculopathy, no b/b dysfunction. CT shows prior fusion, with adjacent level disease at L1/2, MR from OSH shows disc bulge at L1/2 cuasing moderate thecal sac compression. Exam: intact  - No acute neurosurgical intervention  -Neuroradiology concern for osteo at L1/2  -Osteomyelitis work up: ESR, CRP, BCX, MR L spine wwo per medicine  -If biopsy needed, would recommend IR guided biopsy, no surgery is indicated at this time.   - Patient may follow up outpatient with his spine surgeon at [place].

## 2019-12-30 NOTE — ED PROVIDER NOTE - OBJECTIVE STATEMENT
PA NOTE: Patient is a 69 year old male with PMHx of HLD, HTN, MI, Lumbar spinal fusion, right sided kidney stones with stent from 9/2019 to 12/2019, s/p stone and stent removal by dr. Hoenig on 12/2/2019 presents to [facility] ED c/o intermittent severe right sided back and right flank pain x 5 days. DENIES n/v/d, hematuria, fever, chills. As per wife, patient was in so much pain last night that he may have passed out for 2 seconds. No trauma/injuries. Patient first thought it was his back pain and had an MRI L-spine done 3 days ago, brought CD with him to ER today. DENIES fever, chills, n/v/d, blood in urine, dysuria.  ~MONICA Pulido

## 2019-12-30 NOTE — PATIENT PROFILE ADULT - DO YOU FEEL UNSAFE AT HOME, WORK, OR SCHOOL?
on 9/20/2019. Family conference  Dr. Hardy Manuel spoke with patient and spouse this am. Emily Acevedo and her  do not desire to pursue systemic treatment for adenocarcinoma of unknown primary. Will consult Hospice. [address], APRN    09/21/19  7:36 AM  Physician's attestation/substantial contribution:  I, Dr Artie Thompson, independently performed an evaluation on Brittany Benavidez. I have reviewed relevant medical information/data to include but not limited to medication list, relevant appropriate labs and imaging when applicable. I reviewed other physician's notes, ancillary services and nurses assessment. I have reviewed the above documentation completed by the Nurse Practitioner or Physician Assistant. Please see my additional contributions to the history of present illness, physical examination, and assessment/medical decision-making that reflect my findings and impressions. I discussed essential elements of the care plan with the NP or PA and the patient as well. I answered all the questions to the patient's satisfaction. I concur with above stated. Subjective-feeling well this morning. Denies any nausea. They are quite confused about what is the plan regarding disposition. They did talk to the  yesterday. They have not had a formal hospice consult. Objective- alert oriented x3. Lungs are clear. Abdomen is nontender. Assessment/plan:  Metastatic adenocarcinoma- the patient is a poor candidate for palliative treatment due to concurrent comorbidities/hemodialysis. Chemotherapy options are quite limited and with poor response rates. I believe hospice care would be the best option for her. This has been discussed with the patient and her  at bedside today. We will formally consult hospice to make arrangements regarding disposition. This was also discussed with the patient's nurse today.     Artie Thompson MD no

## 2019-12-30 NOTE — H&P ADULT - NSHPPHYSICALEXAM_GEN_ALL_CORE
PHYSICAL EXAM: vital signs as above  in no apparent distress  HEENT: RACHELLE EOMI  Neck: Supple, no JVD, no thyromegaly  Lungs: no wheeze, no crackles  CVS: S1 S2 soft ejection systolic murmur best heard at left sternal border  Abdomen: no tenderness, no organomegaly, BS present  Neuro: AO x 3 no focal weakness, power grade 5/5 bilaterally   no sensory abnormalities  Psych: appropriate affect  Skin: warm, dry  Ext: no cyanosis or clubbing, no edema  Msk: no joint swelling or deformities  Back: no CVA tenderness, no kyphosis/scoliosis

## 2019-12-30 NOTE — H&P ADULT - ASSESSMENT
Patient is a 69 year old male with PMH of HLD, HTN, MI, Lumbar spinal fusion, right sided kidney stones with stent from 9/2019 to 12/2019, s/p stone and stent removal by Dr. Hoenig on 12/2/2019 presents to the Emergency Department  c/o intermittent severe right sided back and right flank pain x 5 days. Out patient work up had revealed possible discitis L1L2 area, of note the patient has no neurology deficit

## 2019-12-30 NOTE — H&P ADULT - PROBLEM SELECTOR PLAN 1
will repeat MRI L/S spine with IV contrast  NS note appreciated  will hold antibiotics for now  ESR 5 and neurologically intact  will have ID evaluate and advise  fall precautions

## 2019-12-30 NOTE — ED PROVIDER NOTE - MUSCULOSKELETAL, MLM
BACK: +Mild diffuse tenderness Lumbar area and paravertebral muscles. Painful flexion and lateral rotation. NVI. 2+ reflexes. Gait steady and normal

## 2019-12-30 NOTE — ED PROVIDER NOTE - PROGRESS NOTE DETAILS
patient's outpatient MRI discussed with Dr. Ramos, concerning for Osteomyelitis L1-L2. Will get neurosurgery, admit to medicine. ~MONICA Pulido paged Dr. zamudio-no longer on call, Dr. Conrad on for unattached. Paged Dr. Conrad, awaiting call back. ~MONICA Pulido 69 year old male with recent kidney stone presents with right sided back pain and right flank pain. Will evaluate for kidney stone with CT, labs, UA. Reassess. ~MONICA Pulido patient's outpatient L-spine MRI discussed with Radiologist Dr. Ramos, concerning for +Osteomyelitis L1-L2. Will get neurosurgery, admit to medicine. ~MONICA Pulido

## 2019-12-30 NOTE — ED ADULT NURSE NOTE - NSIMPLEMENTINTERV_GEN_ALL_ED
Implemented All Fall with Harm Risk Interventions:  [place] to call system. Call bell, personal items and telephone within reach. Instruct patient to call for assistance. Room bathroom lighting operational. Non-slip footwear when patient is off stretcher. Physically safe environment: no spills, clutter or unnecessary equipment. Stretcher in lowest position, wheels locked, appropriate side rails in place. Provide visual cue, wrist band, yellow gown, etc. Monitor gait and stability. Monitor for mental status changes and reorient to person, place, and time. Review medications for side effects contributing to fall risk. Reinforce activity limits and safety measures with patient and family. Provide visual clues: red socks.

## 2019-12-30 NOTE — ED PROVIDER NOTE - NOTES
~Asher Boudreaux PA 12/28/19 outpatient MRI L spine uploaded, discussed with Radiologist Dr. Ramos. +Abnormal signal L1-L2 level concerning for Osteomyelitis or severe arthritis, rec neurosurgery eval. ~MONICA Pulido

## 2019-12-30 NOTE — ED PROVIDER NOTE - RAPID ASSESSMENT
70 yo male with PMHx of HTN, HLD, MI on 2/2019 on Brilinta, kidney stones, kidney stone removal surgery on 12/2 by Dr. Hoenig (with no complications, no stent in place) s/p cardiac cath, s/p lumbar fusion, presents with right sided flank pain for 1 week described as shooting and stabbing pain. Patient also reports slow stream when voiding. Patient states his pain feels as though he has a stent in place. Patient denies fever, chills, nausea, vomiting, hematuria, dysuria, diarrhea, or constipation.     **Pt seen in waiting room by Dexter Roberts, documentation completed by Jodi Eagle. Pt to be sent to main ED for further evaluation - all orders placed to be followed by MD in the main ED**

## 2019-12-30 NOTE — H&P ADULT - ATTENDING COMMENTS
discussed with patient in detail, expresses understanding of treatment plans.   discussed with patient's wife at bedside in detail

## 2019-12-30 NOTE — ED PROVIDER NOTE - CHIEF COMPLAINT
The patient is a 69y Male complaining of PA NOTE: Patient is a 69 year old male with PMHx of HLD, HTN, MI, right sided kidney stones with stent from 9/2019 t0 12/2019, s/p stone and stent removal by dr. Hoenig on 12/2/2019 presents to [facility] ED c/o intermittent severe right sided flank pain x 5 days. DENIES n/v/d, hematuria, fever, chills. As per wife, patient was in so much pain last night that he may have passed out for 2 seconds. No trauma/injuries. Patient first thought it was his back pain and had an MRI L-spine done 2 days ago. PA NOTE: Patient is a 69 year old male with PMHx of HLD, HTN, MI, right sided kidney stones with stent from 9/2019 t0 12/2019, s/p stone and stent removal by dr. Hoenig on 12/2/2019 presents to [facility] ED c/o intermittent severe right sided flank pain x 5 days. DENIES n/v/d, patient c/o right flank and right back pain x 5 days. Patient presents with c/o right flank pain x 5 days, worse since last night.
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
## 2019-12-30 NOTE — ED ADULT NURSE NOTE - OBJECTIVE STATEMENT
70 y/o male with pmhx of renal stones, back sx, and MI with 4 stents on Brilinta c/o R flank pain that has radiated to RLQ that is exacerbated by position change over this past week.  pt denies any n/v/d, constipation, dysuria, hematuria or melena at this time.  mild tenderness noted upon palpation to affected area.  per pt, spoke with his spinal MD who sent him for MRI this past Friday for further assessment.  pt is awake, alert and responsive to all stimuli.  no sob or respiratory distress noted.  vss.  safety precautions in place.  spouse at bedside.  will continue to monitor.

## 2019-12-30 NOTE — H&P ADULT - NSHPREVIEWOFSYSTEMS_GEN_ALL_CORE
Gen: no loss of wt no loss of appetite  ENT: no dizziness no hearing loss  Ophth: no blurring of vision no loss of vision  Resp: No cough no sputum production  CVS: No chest pain no palpitations no orthopnea  GI: no nausea, vomiting or diarrhea no incontinence  : no dysuria, hematuria   Endo: no polyuria no excessive sweating  Neuro: no weakness no paresthesias  Psych: No suicidal no depressive ideation  Heme: No petechiae no easy bruising  see above HPI   Skin: No rash no itching  All other ROS negative

## 2019-12-30 NOTE — ED PROVIDER NOTE - CONSTITUTIONAL, MLM
normal... PA NOTE: Well appearing, awake, alert, oriented to person, place, time/situation and in no apparent distress.

## 2019-12-31 DIAGNOSIS — E53.8 DEFICIENCY OF OTHER SPECIFIED B GROUP VITAMINS: ICD-10-CM

## 2019-12-31 DIAGNOSIS — D69.6 THROMBOCYTOPENIA, UNSPECIFIED: ICD-10-CM

## 2019-12-31 LAB
CRP SERPL-MCNC: <0.1 MG/DL — SIGNIFICANT CHANGE UP (ref 0–0.4)
CRP SERPL-MCNC: <0.1 MG/DL — SIGNIFICANT CHANGE UP (ref 0–0.4)
CULTURE RESULTS: NO GROWTH — SIGNIFICANT CHANGE UP
HCT VFR BLD CALC: 43.3 % — SIGNIFICANT CHANGE UP (ref 39–50)
HCV AB S/CO SERPL IA: 0.13 S/CO — SIGNIFICANT CHANGE UP (ref 0–0.99)
HCV AB SERPL-IMP: SIGNIFICANT CHANGE UP
HGB BLD-MCNC: 14.3 G/DL — SIGNIFICANT CHANGE UP (ref 13–17)
MCHC RBC-ENTMCNC: 30.2 PG — SIGNIFICANT CHANGE UP (ref 27–34)
MCHC RBC-ENTMCNC: 33 GM/DL — SIGNIFICANT CHANGE UP (ref 32–36)
MCV RBC AUTO: 91.5 FL — SIGNIFICANT CHANGE UP (ref 80–100)
PLATELET # BLD AUTO: 128 K/UL — LOW (ref 150–400)
RBC # BLD: 4.73 M/UL — SIGNIFICANT CHANGE UP (ref 4.2–5.8)
RBC # FLD: 12.8 % — SIGNIFICANT CHANGE UP (ref 10.3–14.5)
SPECIMEN SOURCE: SIGNIFICANT CHANGE UP
TSH SERPL-MCNC: 1.58 UIU/ML — SIGNIFICANT CHANGE UP (ref 0.27–4.2)
VIT B12 SERPL-MCNC: 267 PG/ML — SIGNIFICANT CHANGE UP (ref 232–1245)
WBC # BLD: 4.08 K/UL — SIGNIFICANT CHANGE UP (ref 3.8–10.5)
WBC # FLD AUTO: 4.08 K/UL — SIGNIFICANT CHANGE UP (ref 3.8–10.5)

## 2019-12-31 PROCEDURE — 99223 1ST HOSP IP/OBS HIGH 75: CPT

## 2019-12-31 PROCEDURE — 72158 MRI LUMBAR SPINE W/O & W/DYE: CPT | Mod: 26

## 2019-12-31 RX ORDER — PREGABALIN 225 MG/1
1000 CAPSULE ORAL DAILY
Refills: 0 | Status: COMPLETED | OUTPATIENT
Start: 2019-12-31 | End: 2020-01-01

## 2019-12-31 RX ORDER — CHLORHEXIDINE GLUCONATE 213 G/1000ML
1 SOLUTION TOPICAL
Refills: 0 | Status: DISCONTINUED | OUTPATIENT
Start: 2019-12-31 | End: 2020-01-02

## 2019-12-31 RX ADMIN — Medication 50 MILLIGRAM(S): at 05:15

## 2019-12-31 RX ADMIN — CHLORHEXIDINE GLUCONATE 1 APPLICATION(S): 213 SOLUTION TOPICAL at 11:30

## 2019-12-31 RX ADMIN — ATORVASTATIN CALCIUM 80 MILLIGRAM(S): 80 TABLET, FILM COATED ORAL at 21:36

## 2019-12-31 RX ADMIN — LISINOPRIL 5 MILLIGRAM(S): 2.5 TABLET ORAL at 05:15

## 2019-12-31 RX ADMIN — PREGABALIN 1000 MICROGRAM(S): 225 CAPSULE ORAL at 14:21

## 2019-12-31 NOTE — CONSULT NOTE ADULT - SUBJECTIVE AND OBJECTIVE BOX
Patient is a 69y old  Male who presents with a chief complaint of back pain (31 Dec 2019 12:04)      HPI:  Patient is a 69 year old male with PMH of HLD, HTN, MI, Lumbar spinal fusion, right sided kidney stones with stent from 2019 to 2019, s/p stone and stent removal by Dr. Hoenig on 2019 presents to the Emergency Department  c/o intermittent severe right sided back and right flank pain x 5 days. Denies nausea, vomiting diarrhea, hematuria, fever, chills. As per wife, patient was in so much pain last night that he may have passed out for 2 seconds. No trauma/injuries. Patient first thought it was his back pain and had an MRI L-spine done 3 days ago, brought CD with him to the Emergency Department which as per the Emergency Department provider shows possible discitis/ osteomyelitis L1 L2. Admitted for management (30 Dec 2019 19:55)      PAST MEDICAL & SURGICAL HISTORY:  Ureteral stricture: right  Ureteral stone: right  Hyperlipemia  Hypertension  Myocardial infarction: 2019  S/P lumbar fusion: 10/2015, 10/2017  S/P cardiac cath: s/p 2000 (1), 2006 (1), 2019 (2) - total of 4 stents      Social history:   Marital Status:   Occupation: retired   for the [employer]  Lives with: wife    Substance Use : denies  Tobacco Usage:  ( x  ) never smoked   (   ) former smoker   (   ) current smoker  (     ) pack year  (        ) last tobacco use date  Alcohol Usage: denies  Travel: denies  Pets: denies          FAMILY HISTORY:  mother -   62 metastatic breast ca  father-   at age 82  h/o PPM        REVIEW OF SYSTEMS  General:	Denies any malaise fatigue or chills. Fevers absent    Skin:No rash  	  Ophthalmologic:Denies any visual complaints,discharge redness or photophobia  	  ENMT:No nasal discharge,headache,sinus congestion or throat pain.No dental complaints    Respiratory and Thorax:No cough,sputum or chest pain.Denies shortness of breath  	  Cardiovascular:	No chest pain,palpitaions or dizziness    Gastrointestinal:	NO nausea,abdominal pain or diarrhea.    Genitourinary:	pain is episodic and severe      Musculoskeletal:	No joint swelling or pain.No weakness    Neurological:No confusion,diziness.No extremity weakness.No bladder or bowel incontinence	    Psychiatric:No delusions or hallucinations	    Hematology/Lymphatics:	No LN swelling.No gum bleeding     Endocrine:	No recent weight gain or loss.No abnormal heat/cold intolerance    Allergic/Immunologic:	No hives or rash     Allergies    fish (Vomiting; Nausea)  No Known Drug Allergies    Intolerances        Antimicrobials:       MEDICATIONS  (prior antimicrobials ):  vancomycin  IVPB   250 mL/Hr IV Intermittent (19 @ 18:38)                 MEDICATIONS  (STANDING):  atorvastatin 80 milliGRAM(s) Oral at bedtime  chlorhexidine 2% Cloths 1 Application(s) Topical <User Schedule>  lisinopril 5 milliGRAM(s) Oral daily  metoprolol succinate ER 50 milliGRAM(s) Oral daily    MEDICATIONS  (PRN):  acetaminophen   Tablet .. 1000 milliGRAM(s) Oral every 6 hours PRN Mild Pain (1 - 3)  nitroglycerin     SubLingual 0.4 milliGRAM(s) SubLingual every 5 minutes PRN Chest Pain        Vital Signs Last 24 Hrs  T(C): 36.3 (31 Dec 2019 10:09), Max: 36.6 (30 Dec 2019 20:12)  T(F): 97.3 (31 Dec 2019 10:09), Max: 97.9 (30 Dec 2019 20:12)  HR: 71 (31 Dec 2019 10:09) (58 - 72)  BP: 129/71 (31 Dec 2019 10:09) (110/65 - 146/82)  BP(mean): --  RR: 18 (31 Dec 2019 10:09) (17 - 18)  SpO2: 96% (31 Dec 2019 10:09) (96% - 100%)    PHYSICAL EXAM:Pleasant patient in no acute distress.      Constitutional:Comfortable.Awake and alert  No cachexia     Eyes:PERRL EOMI.NO discharge or conjunctival injection    ENMT:No sinus tenderness.No thrush.No pharyngeal exudate or erythema.Fair dental hygiene    Neck:Supple,No LN,no JVD      Respiratory:Good air entry bilaterally,CTA    Cardiovascular:S1 S2 wnl,    Gastrointestinal:Soft BS(+) no tenderness no masses ,No rebound or guarding    Genitourinary:No CVA tendereness     Extremities:No cyanosis,clubbing or edema.    Vascular:peripheral pulses felt    Neurological:AAO X 3,No grossly focal deficits    Skin:No rash     Lymph Nodes:No palpable LNs    Musculoskeletal:No joint swelling or LOM    Psychiatric:Affect normal.                                14.3   4.08  )-----------( 128      ( 31 Dec 2019 09:28 )             43.3     LIVER FUNCTIONS - ( 30 Dec 2019 12:06 )  Alb: 4.5 g/dL / Pro: 7.5 g/dL / ALK PHOS: 74 U/L / ALT: 33 U/L / AST: 28 U/L / GGT: x                 137  |  101  |  19  ----------------------------<  98  3.8   |  25  |  1.35<H>    Ca    9.6      30 Dec 2019 12:06    TPro  7.5  /  Alb  4.5  /  TBili  0.7  /  DBili  x   /  AST  28  /  ALT  33  /  AlkPhos  74        Sedimentation Rate, Erythrocyte (19 @ 18:48)    Sedimentation Rate, Erythrocyte: 5 mm/hr        Urinalysis Basic - ( 30 Dec 2019 13:17 )    Color: Yellow / Appearance: Clear / S.022 / pH: x  Gluc: x / Ketone: Negative  / Bili: Negative / Urobili: Negative   Blood: x / Protein: Negative / Nitrite: Negative   Leuk Esterase: Negative / RBC: 2 /hpf / WBC 1 /HPF   Sq Epi: x / Non Sq Epi: 0 /hpf / Bacteria: Negative        RECENT CULTURES:      MICROBIOLOGY:          Radiology:      < from: CT Abdomen and Pelvis No Cont (19 @ 13:43) >  EXAM:  CT ABDOMEN AND PELVIS                            PROCEDURE DATE:  2019            INTERPRETATION:  CLINICAL INFORMATION: Right flank pain. Recent right stent removal on 2019.     COMPARISON: CT abdomen pelvis from 2019.    PROCEDURE:   CT of the Abdomen and Pelvis was performed without intravenous contrast in the prone position.  Intravenous contrast: None.  Oral contrast: None.  Sagittal and coronal reformats were performed.    FINDINGS:    LOWER CHEST: Coronary atherosclerotic changes. Aortic valvular calcifications..    LIVER: Hepatic cysts and additional bilateral subcentimeter hypodense foci, too small to characterize.  BILE DUCTS: Normal caliber.  GALLBLADDER: Within normal limits.  SPLEEN: Within normal limits.  PANCREAS: Calcifications in the pancreatic body and tail.  ADRENALS: Within normal limits.  KIDNEYS/URETERS: Multiple punctate (less than 2 mm) nonobstructing left intrarenal calculi.  No hydronephrosis.    BLADDER: Within normal limits.  REPRODUCTIVE ORGANS: Prostate is enlarged.    BOWEL: No bowel obstruction. Appendix is normal.  PERITONEUM: No ascites.  VESSELS: Atherosclerotic changes.  RETROPERITONEUM/LYMPH NODES: No lymphadenopathy.    ABDOMINAL WALL: Within normal limits.  BONES: Anteriorfusion L2-L3 with an intervertebral disc spacer. Posterior fusion of L3-S1 with laminectomies.    IMPRESSION:     Multiple punctate (less than 2 mm) nonobstructing left intrarenal calculi.  No hydronephrosis.                    AARON KIM M.D., RADIOLOGY RESIDENT  This document has been electronically signed.  YAKELIN SWANSON M.D., ATTENDING RADIOLOGIST  This document has been electronically signed. Dec 30 2019  2:58PM        < end of copied text > Patient is a 69y old  Male who presents with a chief complaint of back pain (31 Dec 2019 12:04)      HPI:  Patient is a 69 year old male with PMH of HLD, HTN, MI, Lumbar spinal fusion, right sided kidney stones with stent from 2019 to 2019, s/p stone and stent removal by Dr. Hoenig on 2019 presents to the Emergency Department  c/o intermittent severe right sided back and right flank pain x 5 days. Denies nausea, vomiting diarrhea, hematuria, fever, chills. As per wife, patient was in so much pain last night that he may have passed out for 2 seconds. No trauma/injuries. Patient first thought it was his back pain and had an MRI L-spine done 3 days ago, brought CD with him to the Emergency Department which as per the Emergency Department provider shows possible discitis/ osteomyelitis L1 L2. Admitted for management (30 Dec 2019 19:55)      PAST MEDICAL & SURGICAL HISTORY:  Ureteral stricture: right  Ureteral stone: right  Hyperlipemia  Hypertension  Myocardial infarction: 2019  S/P lumbar fusion: 10/2015, 10/2017  S/P cardiac cath: s/p 2000 (1), 2006 (1), 2019 (2) - total of 4 stents      Social history:   Marital Status:   Occupation: retired   for the [employer]  Lives with: wife    Substance Use : denies  Tobacco Usage:  ( x  ) never smoked   (   ) former smoker   (   ) current smoker  (     ) pack year  (        ) last tobacco use date  Alcohol Usage: denies  Travel: denies  Pets: denies          FAMILY HISTORY:  mother -   62 metastatic breast ca  father-   at age 82  h/o PPM        REVIEW OF SYSTEMS  General:	Denies any malaise fatigue or chills. Fevers absent    Skin:No rash  	  Ophthalmologic:Denies any visual complaints,discharge redness or photophobia  	  ENMT:No nasal discharge,headache,sinus congestion or throat pain.No dental complaints    Respiratory and Thorax:No cough,sputum or chest pain.Denies shortness of breath  	  Cardiovascular:	No chest pain,palpitaions or dizziness    Gastrointestinal:	NO nausea,abdominal pain or diarrhea.    Genitourinary:	pain is episodic and severe      Musculoskeletal:	No joint swelling or pain.No weakness    Neurological:No confusion,diziness.No extremity weakness.No bladder or bowel incontinence	    Psychiatric:No delusions or hallucinations	    Hematology/Lymphatics:	No LN swelling.No gum bleeding     Endocrine:	No recent weight gain or loss.No abnormal heat/cold intolerance    Allergic/Immunologic:	No hives or rash     Allergies    fish (Vomiting; Nausea)  No Known Drug Allergies    Intolerances        Antimicrobials:       MEDICATIONS  (prior antimicrobials ):  vancomycin  IVPB   250 mL/Hr IV Intermittent (19 @ 18:38)                 MEDICATIONS  (STANDING):  atorvastatin 80 milliGRAM(s) Oral at bedtime  chlorhexidine 2% Cloths 1 Application(s) Topical <User Schedule>  lisinopril 5 milliGRAM(s) Oral daily  metoprolol succinate ER 50 milliGRAM(s) Oral daily    MEDICATIONS  (PRN):  acetaminophen   Tablet .. 1000 milliGRAM(s) Oral every 6 hours PRN Mild Pain (1 - 3)  nitroglycerin     SubLingual 0.4 milliGRAM(s) SubLingual every 5 minutes PRN Chest Pain        Vital Signs Last 24 Hrs  T(C): 36.3 (31 Dec 2019 10:09), Max: 36.6 (30 Dec 2019 20:12)  T(F): 97.3 (31 Dec 2019 10:09), Max: 97.9 (30 Dec 2019 20:12)  HR: 71 (31 Dec 2019 10:09) (58 - 72)  BP: 129/71 (31 Dec 2019 10:09) (110/65 - 146/82)  BP(mean): --  RR: 18 (31 Dec 2019 10:09) (17 - 18)  SpO2: 96% (31 Dec 2019 10:09) (96% - 100%)    PHYSICAL EXAM:Pleasant patient in no acute distress.      Constitutional:Comfortable.Awake and alert  No cachexia     Eyes:PERRL EOMI.NO discharge or conjunctival injection    ENMT:No sinus tenderness.No thrush.No pharyngeal exudate or erythema.Fair dental hygiene    Neck:Supple,No LN,no JVD      Respiratory:Good air entry bilaterally,CTA    Cardiovascular:S1 S2 wnl,    Gastrointestinal:Soft BS(+) no tenderness no masses ,No rebound or guarding    Genitourinary:No CVA tendereness     Extremities:No cyanosis,clubbing or edema.    Vascular:peripheral pulses felt    Neurological:AAO X 3,No grossly focal deficits    Skin:No rash     Lymph Nodes:No palpable LNs    Musculoskeletal:No joint swelling or LOM    Psychiatric:Affect normal.                                14.3   4.08  )-----------( 128      ( 31 Dec 2019 09:28 )             43.3     LIVER FUNCTIONS - ( 30 Dec 2019 12:06 )  Alb: 4.5 g/dL / Pro: 7.5 g/dL / ALK PHOS: 74 U/L / ALT: 33 U/L / AST: 28 U/L / GGT: x                 137  |  101  |  19  ----------------------------<  98  3.8   |  25  |  1.35<H>    Ca    9.6      30 Dec 2019 12:06    TPro  7.5  /  Alb  4.5  /  TBili  0.7  /  DBili  x   /  AST  28  /  ALT  33  /  AlkPhos  74        Sedimentation Rate, Erythrocyte (19 @ 18:48)    Sedimentation Rate, Erythrocyte: 5 mm/hr        Urinalysis Basic - ( 30 Dec 2019 13:17 )    Color: Yellow / Appearance: Clear / S.022 / pH: x  Gluc: x / Ketone: Negative  / Bili: Negative / Urobili: Negative   Blood: x / Protein: Negative / Nitrite: Negative   Leuk Esterase: Negative / RBC: 2 /hpf / WBC 1 /HPF   Sq Epi: x / Non Sq Epi: 0 /hpf / Bacteria: Negative    C-Reactive Protein, Serum (19 @ 09:27)    C-Reactive Protein, Serum: <0.10: Test Repeated mg/dL        RECENT CULTURES:      MICROBIOLOGY:          Radiology:      < from: CT Abdomen and Pelvis No Cont (19 @ 13:43) >  EXAM:  CT ABDOMEN AND PELVIS                            PROCEDURE DATE:  2019            INTERPRETATION:  CLINICAL INFORMATION: Right flank pain. Recent right stent removal on 2019.     COMPARISON: CT abdomen pelvis from 2019.    PROCEDURE:   CT of the Abdomen and Pelvis was performed without intravenous contrast in the prone position.  Intravenous contrast: None.  Oral contrast: None.  Sagittal and coronal reformats were performed.    FINDINGS:    LOWER CHEST: Coronary atherosclerotic changes. Aortic valvular calcifications..    LIVER: Hepatic cysts and additional bilateral subcentimeter hypodense foci, too small to characterize.  BILE DUCTS: Normal caliber.  GALLBLADDER: Within normal limits.  SPLEEN: Within normal limits.  PANCREAS: Calcifications in the pancreatic body and tail.  ADRENALS: Within normal limits.  KIDNEYS/URETERS: Multiple punctate (less than 2 mm) nonobstructing left intrarenal calculi.  No hydronephrosis.    BLADDER: Within normal limits.  REPRODUCTIVE ORGANS: Prostate is enlarged.    BOWEL: No bowel obstruction. Appendix is normal.  PERITONEUM: No ascites.  VESSELS: Atherosclerotic changes.  RETROPERITONEUM/LYMPH NODES: No lymphadenopathy.    ABDOMINAL WALL: Within normal limits.  BONES: Anteriorfusion L2-L3 with an intervertebral disc spacer. Posterior fusion of L3-S1 with laminectomies.    IMPRESSION:     Multiple punctate (less than 2 mm) nonobstructing left intrarenal calculi.  No hydronephrosis.                    AARON KIM M.D., RADIOLOGY RESIDENT  This document has been electronically signed.  YAKELIN SWANSON M.D., ATTENDING RADIOLOGIST  This document has been electronically signed. Dec 30 2019  2:58PM        < end of copied text >

## 2019-12-31 NOTE — PROGRESS NOTE ADULT - PROBLEM SELECTOR PLAN 1
repeat MRI L/S spine with IV contrast  Neurosurgery note appreciated   will hold antibiotics for now  ESR 5 and CRP normal   ID evaluation  fall precautions  active infection seems less likely in the face of totally normal inflammatory markers but will await repeat MRI and ID opinion

## 2019-12-31 NOTE — CONSULT NOTE ADULT - ASSESSMENT
Patient is a 69 year old male with PMH of HLD, HTN, MI, Lumbar spinal fusion, right sided kidney stones with stent from 9/2019 to 12/2019, s/p stone and stent removal by Dr. Hoenig on 12/2/2019 presents to the Emergency Department  c/o intermittent severe right sided back and right flank pain x 5 days. Denies nausea, vomiting diarrhea, hematuria, fever, chills. As per wife, patient was in so much pain last night that he may have passed out for 2 seconds. No trauma/injuries. Patient first thought it was his back pain and had an MRI L-spine done 3 days ago, brought CD with him to the Emergency Department which as per the Emergency Department provider shows possible discitis/ osteomyelitis L1 L2. Admitted for management (30 Dec 2019 19:55)    MRI personally reviewed with attending neuroradiologist. Ct also reviewed for bony issues. Although findings coudl be c/w osteo , they also could be c/w degenerative disease    ESR is low speaking against infection  check CRP  await MRI with contrast    QUestions then is , what is causing  the pain. Still can be of urological origin.  workup in progress. S?p recent stone extraction and stent    would continue to monitor off abs    check BC x 2 sets      I will be awaytomorrow. My associates will be covering. Please call [phone] with acute issues, questions. Patient is a 69 year old male with PMH of HLD, HTN, MI, Lumbar spinal fusion, right sided kidney stones with stent from 9/2019 to 12/2019, s/p stone and stent removal by Dr. Hoenig on 12/2/2019 presents to the Emergency Department  c/o intermittent severe right sided back and right flank pain x 5 days. Denies nausea, vomiting diarrhea, hematuria, fever, chills. As per wife, patient was in so much pain last night that he may have passed out for 2 seconds. No trauma/injuries. Patient first thought it was his back pain and had an MRI L-spine done 3 days ago, brought CD with him to the Emergency Department which as per the Emergency Department provider shows possible discitis/ osteomyelitis L1 L2. Admitted for management (30 Dec 2019 19:55)    MRI personally reviewed with attending neuroradiologist. Ct also reviewed for bony issues. Although findings coudl be c/w osteo , they also could be c/w degenerative disease    ESR and CRP are  low speaking against infection    await MRI with contrast    QUestions then is , what is causing  the pain. Still can be of urological origin.  workup in progress. S?p recent stone extraction and stent    would continue to monitor off abs    check BC x 2 sets      I will be awaytomorrow. My associates will be covering. Please call [phone] with acute issues, questions.

## 2019-12-31 NOTE — PROGRESS NOTE ADULT - ASSESSMENT
Patient is a 69 year old male with PMH of HLD, HTN, MI, Lumbar spinal fusion, right sided kidney stones with stent from 9/2019 to 12/2019, s/p stone and stent removal by Dr. Hoenig on 12/2/2019 presents to the Emergency Department  c/o intermittent severe right sided back and right flank pain x 5 days. Out patient work up had revealed possible discitis L1L2 area, of note the patient continues to have no neurological deficit

## 2020-01-01 DIAGNOSIS — N18.3 CHRONIC KIDNEY DISEASE, STAGE 3 (MODERATE): ICD-10-CM

## 2020-01-01 LAB
ANION GAP SERPL CALC-SCNC: 12 MMOL/L — SIGNIFICANT CHANGE UP (ref 5–17)
BUN SERPL-MCNC: 22 MG/DL — SIGNIFICANT CHANGE UP (ref 7–23)
CALCIUM SERPL-MCNC: 8.9 MG/DL — SIGNIFICANT CHANGE UP (ref 8.4–10.5)
CHLORIDE SERPL-SCNC: 103 MMOL/L — SIGNIFICANT CHANGE UP (ref 96–108)
CO2 SERPL-SCNC: 23 MMOL/L — SIGNIFICANT CHANGE UP (ref 22–31)
CREAT SERPL-MCNC: 1.37 MG/DL — HIGH (ref 0.5–1.3)
GLUCOSE SERPL-MCNC: 109 MG/DL — HIGH (ref 70–99)
HCT VFR BLD CALC: 42.1 % — SIGNIFICANT CHANGE UP (ref 39–50)
HGB BLD-MCNC: 14 G/DL — SIGNIFICANT CHANGE UP (ref 13–17)
MCHC RBC-ENTMCNC: 30.1 PG — SIGNIFICANT CHANGE UP (ref 27–34)
MCHC RBC-ENTMCNC: 33.3 GM/DL — SIGNIFICANT CHANGE UP (ref 32–36)
MCV RBC AUTO: 90.5 FL — SIGNIFICANT CHANGE UP (ref 80–100)
PLATELET # BLD AUTO: 124 K/UL — LOW (ref 150–400)
POTASSIUM SERPL-MCNC: 3.8 MMOL/L — SIGNIFICANT CHANGE UP (ref 3.5–5.3)
POTASSIUM SERPL-SCNC: 3.8 MMOL/L — SIGNIFICANT CHANGE UP (ref 3.5–5.3)
RBC # BLD: 4.65 M/UL — SIGNIFICANT CHANGE UP (ref 4.2–5.8)
RBC # FLD: 12.8 % — SIGNIFICANT CHANGE UP (ref 10.3–14.5)
SODIUM SERPL-SCNC: 138 MMOL/L — SIGNIFICANT CHANGE UP (ref 135–145)
WBC # BLD: 7.35 K/UL — SIGNIFICANT CHANGE UP (ref 3.8–10.5)
WBC # FLD AUTO: 7.35 K/UL — SIGNIFICANT CHANGE UP (ref 3.8–10.5)

## 2020-01-01 RX ADMIN — LISINOPRIL 5 MILLIGRAM(S): 2.5 TABLET ORAL at 05:24

## 2020-01-01 RX ADMIN — ATORVASTATIN CALCIUM 80 MILLIGRAM(S): 80 TABLET, FILM COATED ORAL at 21:42

## 2020-01-01 RX ADMIN — PREGABALIN 1000 MICROGRAM(S): 225 CAPSULE ORAL at 12:42

## 2020-01-01 RX ADMIN — Medication 50 MILLIGRAM(S): at 05:24

## 2020-01-01 RX ADMIN — CHLORHEXIDINE GLUCONATE 1 APPLICATION(S): 213 SOLUTION TOPICAL at 12:43

## 2020-01-01 NOTE — PROGRESS NOTE ADULT - PROBLEM SELECTOR PLAN 1
repeat MRI L/S spine with IV contrast positive for discitis osteomyelitis L1L2  no cord compression no cauda equina involvement no epidural abscess  will discuss with ID but likely will hold antibiotics till diagnostic aspiration performed  patient is fully neurologically intact   continue fall precautions repeat MRI L/S spine with IV contrast positive for discitis osteomyelitis L1L2  no cord compression no cauda equina involvement no epidural abscess  unclear source at this time but possibly recent urological intervention required at this time could have caused bacteremia  however blood cultures remain negative   echocardiogram   will discuss with ID but likely will hold antibiotics till diagnostic aspiration performed  patient is fully neurologically intact   continue fall precautions

## 2020-01-01 NOTE — PROGRESS NOTE ADULT - PROBLEM SELECTOR PLAN 3
hold ASA and Brilinta for now  as likely aspiration of vertebra will be required  patient had YANG > 10 months prior

## 2020-01-01 NOTE — PROVIDER CONTACT NOTE (CRITICAL VALUE NOTIFICATION) - TEST AND RESULT REPORTED:
Preliminary results: blood cultures from 12/31/19, growth in aerobic and anarobic bottles: gram positive cocci in pairs and chains.

## 2020-01-01 NOTE — PROGRESS NOTE ADULT - SUBJECTIVE AND OBJECTIVE BOX
Patient is a 69y old  Male who presents with a chief complaint of back pain (31 Dec 2019 12:18)      SUBJECTIVE / OVERNIGHT EVENTS: overnight events noted    ROS:  Resp: No cough no sputum production  CVS: No chest pain no palpitations no orthopnea  GI: no N/V/D  : no dysuria, no hematuria  Neuro: no weakness no paresthesias  Heme: No petechiae no easy bruising  Msk: No joint pain no swelling  back pain improved   Skin: No rash no itching        MEDICATIONS  (STANDING):  atorvastatin 80 milliGRAM(s) Oral at bedtime  chlorhexidine 2% Cloths 1 Application(s) Topical <User Schedule>  lisinopril 5 milliGRAM(s) Oral daily  metoprolol succinate ER 50 milliGRAM(s) Oral daily    MEDICATIONS  (PRN):  acetaminophen   Tablet .. 1000 milliGRAM(s) Oral every 6 hours PRN Mild Pain (1 - 3)  nitroglycerin     SubLingual 0.4 milliGRAM(s) SubLingual every 5 minutes PRN Chest Pain        CAPILLARY BLOOD GLUCOSE        I&O's Summary    31 Dec 2019 07:01  -  01 Jan 2020 07:00  --------------------------------------------------------  IN: 680 mL / OUT: 0 mL / NET: 680 mL    01 Jan 2020 07:01  -  01 Jan 2020 14:08  --------------------------------------------------------  IN: 240 mL / OUT: 0 mL / NET: 240 mL        Vital Signs Last 24 Hrs  T(C): 36.4 (01 Jan 2020 08:06), Max: 36.7 (31 Dec 2019 15:28)  T(F): 97.6 (01 Jan 2020 08:06), Max: 98.1 (31 Dec 2019 15:28)  HR: 56 (01 Jan 2020 08:06) (56 - 73)  BP: 113/71 (01 Jan 2020 08:06) (103/63 - 116/69)  BP(mean): --  RR: 18 (01 Jan 2020 08:06) (16 - 18)  SpO2: 96% (01 Jan 2020 08:06) (95% - 96%)    PHYSICAL EXAM: vital signs as above  in no apparent distress  HEENT: RACHELLE EOMI  Neck: Supple, no JVD, no thyromegaly  Lungs: no wheeze, no crackles  CVS: S1 S2 soft ejection systolic murmur best heard at left sternal border  Abdomen: no tenderness, no organomegaly, BS present  Neuro: AO x 3 no focal weakness, power continues to be grade 5/5 bilaterally   no sensory abnormalities  Psych: appropriate affect  Skin: warm, dry  Ext: no cyanosis or clubbing, no edema  Msk: no joint swelling or deformities  Back: no CVA tenderness, no kyphosis/scoliosis    LABS:                        14.0   7.35  )-----------( 124      ( 01 Jan 2020 08:21 )             42.1     01-01    138  |  103  |  22  ----------------------------<  109<H>  3.8   |  23  |  1.37<H>    Ca    8.9      01 Jan 2020 06:09                  All consultant(s) notes reviewed and care discussed with other providers        Contact Number, Dr Conrad [phone]

## 2020-01-02 ENCOUNTER — TRANSCRIPTION ENCOUNTER (OUTPATIENT)
Age: 70
End: 2020-01-02

## 2020-01-02 VITALS
OXYGEN SATURATION: 97 % | TEMPERATURE: 98 F | DIASTOLIC BLOOD PRESSURE: 73 MMHG | RESPIRATION RATE: 18 BRPM | HEART RATE: 65 BPM | SYSTOLIC BLOOD PRESSURE: 129 MMHG

## 2020-01-02 LAB
ANION GAP SERPL CALC-SCNC: 10 MMOL/L — SIGNIFICANT CHANGE UP (ref 5–17)
BUN SERPL-MCNC: 28 MG/DL — HIGH (ref 7–23)
CALCIUM SERPL-MCNC: 9.4 MG/DL — SIGNIFICANT CHANGE UP (ref 8.4–10.5)
CHLORIDE SERPL-SCNC: 105 MMOL/L — SIGNIFICANT CHANGE UP (ref 96–108)
CO2 SERPL-SCNC: 24 MMOL/L — SIGNIFICANT CHANGE UP (ref 22–31)
CREAT SERPL-MCNC: 1.35 MG/DL — HIGH (ref 0.5–1.3)
GLUCOSE SERPL-MCNC: 106 MG/DL — HIGH (ref 70–99)
HCT VFR BLD CALC: 42 % — SIGNIFICANT CHANGE UP (ref 39–50)
HGB BLD-MCNC: 13.9 G/DL — SIGNIFICANT CHANGE UP (ref 13–17)
MCHC RBC-ENTMCNC: 30.1 PG — SIGNIFICANT CHANGE UP (ref 27–34)
MCHC RBC-ENTMCNC: 33.1 GM/DL — SIGNIFICANT CHANGE UP (ref 32–36)
MCV RBC AUTO: 90.9 FL — SIGNIFICANT CHANGE UP (ref 80–100)
PLATELET # BLD AUTO: 127 K/UL — LOW (ref 150–400)
POTASSIUM SERPL-MCNC: 3.9 MMOL/L — SIGNIFICANT CHANGE UP (ref 3.5–5.3)
POTASSIUM SERPL-SCNC: 3.9 MMOL/L — SIGNIFICANT CHANGE UP (ref 3.5–5.3)
RBC # BLD: 4.62 M/UL — SIGNIFICANT CHANGE UP (ref 4.2–5.8)
RBC # FLD: 12.8 % — SIGNIFICANT CHANGE UP (ref 10.3–14.5)
SODIUM SERPL-SCNC: 139 MMOL/L — SIGNIFICANT CHANGE UP (ref 135–145)
WBC # BLD: 6.34 K/UL — SIGNIFICANT CHANGE UP (ref 3.8–10.5)
WBC # FLD AUTO: 6.34 K/UL — SIGNIFICANT CHANGE UP (ref 3.8–10.5)

## 2020-01-02 PROCEDURE — 80053 COMPREHEN METABOLIC PANEL: CPT

## 2020-01-02 PROCEDURE — 87086 URINE CULTURE/COLONY COUNT: CPT

## 2020-01-02 PROCEDURE — 96375 TX/PRO/DX INJ NEW DRUG ADDON: CPT

## 2020-01-02 PROCEDURE — 85652 RBC SED RATE AUTOMATED: CPT

## 2020-01-02 PROCEDURE — 82330 ASSAY OF CALCIUM: CPT

## 2020-01-02 PROCEDURE — 85014 HEMATOCRIT: CPT

## 2020-01-02 PROCEDURE — 72158 MRI LUMBAR SPINE W/O & W/DYE: CPT

## 2020-01-02 PROCEDURE — 74176 CT ABD & PELVIS W/O CONTRAST: CPT

## 2020-01-02 PROCEDURE — 86140 C-REACTIVE PROTEIN: CPT

## 2020-01-02 PROCEDURE — 81001 URINALYSIS AUTO W/SCOPE: CPT

## 2020-01-02 PROCEDURE — 82947 ASSAY GLUCOSE BLOOD QUANT: CPT

## 2020-01-02 PROCEDURE — 84295 ASSAY OF SERUM SODIUM: CPT

## 2020-01-02 PROCEDURE — 99238 HOSP IP/OBS DSCHRG MGMT 30/<: CPT

## 2020-01-02 PROCEDURE — 82803 BLOOD GASES ANY COMBINATION: CPT

## 2020-01-02 PROCEDURE — 85027 COMPLETE CBC AUTOMATED: CPT

## 2020-01-02 PROCEDURE — A9585: CPT

## 2020-01-02 PROCEDURE — 82435 ASSAY OF BLOOD CHLORIDE: CPT

## 2020-01-02 PROCEDURE — 86803 HEPATITIS C AB TEST: CPT

## 2020-01-02 PROCEDURE — 83605 ASSAY OF LACTIC ACID: CPT

## 2020-01-02 PROCEDURE — 82607 VITAMIN B-12: CPT

## 2020-01-02 PROCEDURE — 84443 ASSAY THYROID STIM HORMONE: CPT

## 2020-01-02 PROCEDURE — 96374 THER/PROPH/DIAG INJ IV PUSH: CPT

## 2020-01-02 PROCEDURE — 99232 SBSQ HOSP IP/OBS MODERATE 35: CPT

## 2020-01-02 PROCEDURE — 84132 ASSAY OF SERUM POTASSIUM: CPT

## 2020-01-02 PROCEDURE — 80048 BASIC METABOLIC PNL TOTAL CA: CPT

## 2020-01-02 PROCEDURE — 99285 EMERGENCY DEPT VISIT HI MDM: CPT | Mod: 25

## 2020-01-02 PROCEDURE — 87040 BLOOD CULTURE FOR BACTERIA: CPT

## 2020-01-02 RX ADMIN — Medication 1000 MILLIGRAM(S): at 03:22

## 2020-01-02 RX ADMIN — CHLORHEXIDINE GLUCONATE 1 APPLICATION(S): 213 SOLUTION TOPICAL at 10:09

## 2020-01-02 RX ADMIN — Medication 1000 MILLIGRAM(S): at 04:28

## 2020-01-02 RX ADMIN — Medication 50 MILLIGRAM(S): at 05:37

## 2020-01-02 RX ADMIN — LISINOPRIL 5 MILLIGRAM(S): 2.5 TABLET ORAL at 05:37

## 2020-01-02 NOTE — PROGRESS NOTE ADULT - ATTENDING COMMENTS
Alyse Rivera M.D. ,   Pager [phone]     after 5PM/ weekends [phone]
discussed with patient in detail, expresses understanding of treatment plans.
discussed with patient in detail
discussed with patient in detail, expresses understanding of treatment plans.  discussed with patient's wife at bedside in detail

## 2020-01-02 NOTE — DISCHARGE NOTE PROVIDER - HOSPITAL COURSE
69 year old male with PMH of HLD, HTN, MI, Lumbar spinal fusion, right sided kidney stones with stent from 9/2019 to 12/2019, s/p stone and stent removal by Dr. Hoenig on 12/2/2019 presents to the Emergency Department  c/o intermittent severe right sided back and right flank pain x 5 days. Denies nausea, vomiting diarrhea, hematuria, fever, chills. As per wife, patient was in so much pain last night that he may have passed out for 2 seconds. No trauma/injuries. Patient first thought it was his back pain and had an MRI L-spine done 3 days ago, brought CD with him to the Emergency Department which as per the Emergency Department provider shows possible discitis/ osteomyelitis L1 L2. Admitted for management (30 Dec 2019 19:55)        MRI personally reviewed with attending neuroradiologist. Ct also reviewed for bony issues. Although findings coudl be c/w osteo , they also could be c/w degenerative disease        ESR and CRP are  low speaking against infection        reviewed  MRI with contrast- unfortunately , it cannot give absolute diagnosis        QUestion then is , what is causing  the pain?. Still can be of urological origin.  workup in progress. S/p recent stone extraction and stent  or could it just be worsening arthritic pain or is it osteomyelitis/discitis? or other ?        would continue to monitor off abs        check BC x 2 sets- so far NGTD        repeat ESR/CRP    will need biopsy to help clarify diagnosis    Please send cultures for routine cxs, AFB and fungus and tissue for cytology        will need to trend platelets, not on heparin. 69 year old male with PMH of HLD, HTN, MI, Lumbar spinal fusion, right sided kidney stones with stent from 9/2019 to 12/2019, s/p stone and stent removal by Dr. Hoenig on 12/2/2019 presents to the Emergency Department  c/o intermittent severe right sided back and right flank pain x 5 days. Denies nausea, vomiting diarrhea, hematuria, fever, chills. As per wife, patient was in so much pain last night that he may have passed out for 2 seconds. No trauma/injuries. Patient first thought it was his back pain and had an MRI L-spine done 3 days ago, brought CD with him to the Emergency Department which as per the Emergency Department provider shows possible discitis/ osteomyelitis L1 L2. Admitted for management (30 Dec 2019 19:55)             Problem/Plan - 1:    ·  Problem: Osteomyelitis of lumbar vertebra.  Plan: repeat MRI L/S spine with IV contrast positive for discitis osteomyelitis L1L2    no cord compression no cauda equina involvement no epidural abscess    unclear source at this time but possibly recent urological intervention required at this time could have caused bacteremia    however blood cultures remain negative     echocardiogram     patient is fully neurologically intact     continue fall precautions.             Problem/Plan - 2:    ·  Problem: Hypertension.  Plan: acceptable    will continue home meds with hold parameters.          Problem/Plan - 3:    ·  Problem: CAD (coronary artery disease).  Plan: hold ASA and Brilinta for now    as likely aspiration of vertebra will be required    patient had YANG > 10 months prior.          Problem/Plan - 4:    ·  Problem: Hyperlipemia.  Plan: continue statin.          Problem/Plan - 5:    ·  Problem: B12 deficiency.  Plan: low normal B12    s/p repletion    start po B12 1000 mcg po discharge qd.          Problem/Plan - 6:    Problem: Thrombocytopenia. Plan: unclear etiology ? osteomyelitis vs mild ITP    will continue to monitor    no intervention required at this time.         Problem/Plan - 7:    ·  Problem: CKD (chronic kidney disease), stage III.  Plan: likely secondary to recurrent renal stones and possibly HTN    will continue to monitor. 69 year old male with PMH of HLD, HTN, MI, Lumbar spinal fusion, right sided kidney stones with stent from 9/2019 to 12/2019, s/p stone and stent removal by Dr. Hoenig on 12/2/2019 presents to the Emergency Department  c/o intermittent severe right sided back and right flank pain x 5 days. Denies nausea, vomiting diarrhea, hematuria, fever, chills. As per wife, patient was in so much pain last night that he may have passed out for 2 seconds. No trauma/injuries. Patient first thought it was his back pain and had an MRI L-spine done 3 days ago, brought CD with him to the Emergency Department which as per the Emergency Department provider shows possible discitis/ osteomyelitis L1 L2. Admitted for management (30 Dec 2019 19:55)             Problem/Plan - 1:    ·  Problem: Osteomyelitis of lumbar vertebra.  Plan: repeat MRI L/S spine with IV contrast positive for discitis osteomyelitis L1L2    no cord compression no cauda equina involvement no epidural abscess    unclear source at this time but possibly recent urological intervention required at this time could have caused bacteremia    however blood cultures remain negative     echocardiogram     patient is fully neurologically intact     continue fall precautions.             Problem/Plan - 2:    ·  Problem: Hypertension.  Plan: acceptable    will continue home meds with hold parameters.          Problem/Plan - 3:    ·  Problem: CAD (coronary artery disease).  Plan: hold ASA and Brilinta for now    as likely aspiration of vertebra will be required    patient had YANG > 10 months prior.          Problem/Plan - 4:    ·  Problem: Hyperlipemia.  Plan: continue statin.          Problem/Plan - 5:    ·  Problem: B12 deficiency.  Plan: low normal B12    s/p repletion    start po B12 1000 mcg po discharge qd.          Problem/Plan - 6:    Problem: Thrombocytopenia. Plan: unclear etiology ? osteomyelitis vs mild ITP    will continue to monitor    no intervention required at this time.         Problem/Plan - 7:    ·  Problem: CKD (chronic kidney disease), stage III.  Plan: likely secondary to recurrent renal stones and possibly HTN    will continue to monitor.           LUMBAR Vertebra OM- MRI with contrast-cannot give absolute diagnosis    will need biopsy to help clarify diagnosis, but Pt wants to pursue with his ORTHOPEDIC SURGEON, Dr. Carlo Rosenthal as outpatient. Medicine attending spoke to Pt and decided to go AMA today.    Advised Pt to follow up with orthopedic doctor as soon as possible. 69 year old male with PMH of HLD, HTN, MI, Lumbar spinal fusion, right sided kidney stones with stent from 9/2019 to 12/2019, s/p stone and stent removal by Dr. Hoenig on 12/2/2019 presents to the Emergency Department  c/o intermittent severe right sided back and right flank pain x 5 days. Denies nausea, vomiting diarrhea, hematuria, fever, chills. As per wife, patient was in so much pain last night that he may have passed out for 2 seconds. No trauma/injuries. Patient first thought it was his back pain and had an MRI L-spine done 3 days ago, brought CD with him to the Emergency Department which as per the Emergency Department provider shows possible discitis/ osteomyelitis L1 L2. Admitted for management (30 Dec 2019 19:55)             Problem/Plan - 1:    ·  Problem: Osteomyelitis of lumbar vertebra.  Plan: repeat MRI L/S spine with IV contrast positive for discitis osteomyelitis L1L2    no cord compression no cauda equina involvement no epidural abscess    unclear source at this time but possibly recent urological intervention required at this time could have caused bacteremia    however blood cultures remain negative     echocardiogram     patient is fully neurologically intact     continue fall precautions.             Problem/Plan - 2:    ·  Problem: Hypertension.  Plan: acceptable    will continue home meds with hold parameters.          Problem/Plan - 3:    ·  Problem: CAD (coronary artery disease).  Plan: hold ASA and Brilinta for now    as likely aspiration of vertebra will be required    patient had YANG > 10 months prior.          Problem/Plan - 4:    ·  Problem: Hyperlipemia.  Plan: continue statin.          Problem/Plan - 5:    ·  Problem: B12 deficiency.  Plan: low normal B12    s/p repletion         Problem/Plan - 6:    Problem: Thrombocytopenia. Plan: unclear etiology ? osteomyelitis vs mild ITP    will continue to monitor    no intervention required at this time.         Problem/Plan - 7:    ·  Problem: CKD (chronic kidney disease), stage III.  Plan: likely secondary to recurrent renal stones and possibly HTN    will continue to monitor.           LUMBAR Vertebra OM- MRI with contrast-cannot give absolute diagnosis    will need biopsy to help clarify diagnosis, but Pt wants to pursue with his ORTHOPEDIC SURGEON, Dr. Carlo Rosenthal as outpatient. Medicine attending spoke to Pt and decided to go AMA today.    Advised Pt to follow up with orthopedic doctor as soon as possible.

## 2020-01-02 NOTE — DISCHARGE NOTE PROVIDER - PROVIDER TOKENS
FREE:[LAST:[PMD as soon as possible],PHONE:[(   )    -],FAX:[(   )    -]] FREE:[LAST:[PMD as soon as possible],PHONE:[(   )    -],FAX:[(   )    -]],FREE:[LAST:[ORTHOPEDIC SURGEON],PHONE:[(   )    -],FAX:[(   )    -]],FREE:[LAST:[PMD],PHONE:[(   )    -],FAX:[(   )    -]]

## 2020-01-02 NOTE — PROGRESS NOTE ADULT - ASSESSMENT
Patient is a 69 year old male with PMH of HLD, HTN, MI, Lumbar spinal fusion, right sided kidney stones with stent from 9/2019 to 12/2019, s/p stone and stent removal by Dr. Hoenig on 12/2/2019 presents to the Emergency Department  c/o intermittent severe right sided back and right flank pain x 5 days. Denies nausea, vomiting diarrhea, hematuria, fever, chills. As per wife, patient was in so much pain last night that he may have passed out for 2 seconds. No trauma/injuries. Patient first thought it was his back pain and had an MRI L-spine done 3 days ago, brought CD with him to the Emergency Department which as per the Emergency Department provider shows possible discitis/ osteomyelitis L1 L2. Admitted for management (30 Dec 2019 19:55)    MRI personally reviewed with attending neuroradiologist. Ct also reviewed for bony issues. Although findings coudl be c/w osteo , they also could be c/w degenerative disease    ESR and CRP are  low speaking against infection    reviewed  MRI with contrast- unfortunately , it cannot give absolute diagnosis    QUestion then is , what is causing  the pain?. Still can be of urological origin.  workup in progress. S/p recent stone extraction and stent  or could it just be worsening arthritic pain or is it osteomyelitis/discitis? or other ?    would continue to monitor off abs    check BC x 2 sets- so far NGTD    repeat ESR/CRP  will need biopsy to help clarify diagnosis  Please send cultures for routine cxs, AFB and fungus and tissue for cytology    I will be away as of tomorrow. My associates will be covering. Please call [phone] with acute issues, questions. Patient is a 69 year old male with PMH of HLD, HTN, MI, Lumbar spinal fusion, right sided kidney stones with stent from 9/2019 to 12/2019, s/p stone and stent removal by Dr. Hoenig on 12/2/2019 presents to the Emergency Department  c/o intermittent severe right sided back and right flank pain x 5 days. Denies nausea, vomiting diarrhea, hematuria, fever, chills. As per wife, patient was in so much pain last night that he may have passed out for 2 seconds. No trauma/injuries. Patient first thought it was his back pain and had an MRI L-spine done 3 days ago, brought CD with him to the Emergency Department which as per the Emergency Department provider shows possible discitis/ osteomyelitis L1 L2. Admitted for management (30 Dec 2019 19:55)    MRI personally reviewed with attending neuroradiologist. Ct also reviewed for bony issues. Although findings coudl be c/w osteo , they also could be c/w degenerative disease    ESR and CRP are  low speaking against infection    reviewed  MRI with contrast- unfortunately , it cannot give absolute diagnosis    QUestion then is , what is causing  the pain?. Still can be of urological origin.  workup in progress. S/p recent stone extraction and stent  or could it just be worsening arthritic pain or is it osteomyelitis/discitis? or other ?    would continue to monitor off abs    check BC x 2 sets- so far NGTD    repeat ESR/CRP  will need biopsy to help clarify diagnosis  Please send cultures for routine cxs, AFB and fungus and tissue for cytology    will need to trend platelets, not on heparin.    I will be away as of tomorrow. My associates will be covering. Please call [phone] with acute issues, questions.

## 2020-01-02 NOTE — PROGRESS NOTE ADULT - SUBJECTIVE AND OBJECTIVE BOX
Patient is a 69y old  Male who presents with a chief complaint of back pain (02 Jan 2020 13:52)      SUBJECTIVE / OVERNIGHT EVENTS: overnight events noted    ROS:  Resp: No cough no sputum production  CVS: No chest pain no palpitations no orthopnea  GI: no N/V/D  : no dysuria, no hematuria  Neuro: no weakness no paresthesias  Heme: No petechiae no easy bruising  Msk: No joint pain no swelling  Skin: No rash no itching        MEDICATIONS  (STANDING):  atorvastatin 80 milliGRAM(s) Oral at bedtime  chlorhexidine 2% Cloths 1 Application(s) Topical <User Schedule>  lisinopril 5 milliGRAM(s) Oral daily  metoprolol succinate ER 50 milliGRAM(s) Oral daily    MEDICATIONS  (PRN):  acetaminophen   Tablet .. 1000 milliGRAM(s) Oral every 6 hours PRN Mild Pain (1 - 3)  nitroglycerin     SubLingual 0.4 milliGRAM(s) SubLingual every 5 minutes PRN Chest Pain        CAPILLARY BLOOD GLUCOSE        I&O's Summary    01 Jan 2020 07:01  -  02 Jan 2020 07:00  --------------------------------------------------------  IN: 760 mL / OUT: 0 mL / NET: 760 mL    02 Jan 2020 07:01  -  02 Jan 2020 16:47  --------------------------------------------------------  IN: 860 mL / OUT: 0 mL / NET: 860 mL        Vital Signs Last 24 Hrs  T(C): 36.4 (02 Jan 2020 15:39), Max: 36.7 (01 Jan 2020 17:37)  T(F): 97.5 (02 Jan 2020 15:39), Max: 98 (01 Jan 2020 17:37)  HR: 65 (02 Jan 2020 15:39) (52 - 65)  BP: 129/73 (02 Jan 2020 15:39) (110/65 - 129/75)  BP(mean): --  RR: 18 (02 Jan 2020 15:39) (18 - 18)  SpO2: 97% (02 Jan 2020 15:39) (96% - 99%)    PHYSICAL EXAM: vital signs as above  in no apparent distress  HEENT: RACHELLE EOMI  Neck: Supple, no JVD, no thyromegaly  Lungs: no wheeze, no crackles  CVS: S1 S2 soft ejection systolic murmur best heard at left sternal border  Abdomen: no tenderness, no organomegaly, BS present  Neuro: AO x 3 no focal weakness, power grade 5/5 bilaterally   no sensory abnormalities  Psych: appropriate affect  Skin: warm, dry  Ext: no cyanosis or clubbing, no edema  Msk: no joint swelling or deformities  Back: no CVA tenderness, no kyphosis/scoliosis    LABS:                        13.9   6.34  )-----------( 127      ( 02 Jan 2020 08:49 )             42.0     01-02    139  |  105  |  28<H>  ----------------------------<  106<H>  3.9   |  24  |  1.35<H>    Ca    9.4      02 Jan 2020 07:10                  All consultant(s) notes reviewed and care discussed with other providers        Contact Number, Dr Conrad [phone]

## 2020-01-02 NOTE — DISCHARGE NOTE PROVIDER - NSDCCPCAREPLAN_GEN_ALL_CORE_FT
PRINCIPAL DISCHARGE DIAGNOSIS  Diagnosis: Osteomyelitis of lumbar vertebra  Assessment and Plan of Treatment: Osteomyelitis of lumbar vertebra.  Plan: repeat MRI L/S spine with IV contrast positive for discitis osteomyelitis L1L2  no cord compression no cauda equina involvement no epidural abscess  unclear source at this time but possibly recent urological intervention required at this time could have caused bacteremia  however blood cultures remain negative   echocardiogram   patient is fully neurologically intact   continue fall precautions.  Pt refusing vertibral biopsy at this time, want to get discharged  ADVISED TO FOLLOW UP WITH [facility] AS SOON AS POSSIBLE      SECONDARY DISCHARGE DIAGNOSES  Diagnosis: CAD (coronary artery disease)  Assessment and Plan of Treatment: CAD (coronary artery disease).  Plan: hold ASA and Brilinta for now  as likely aspiration of vertebra will be required  patient had YANG > 10 months prior.    Diagnosis: Hypertension  Assessment and Plan of Treatment: Hypertension.  Plan: acceptable  will continue home meds with hold parameters.    Diagnosis: Back pain  Assessment and Plan of Treatment:

## 2020-01-02 NOTE — DISCHARGE NOTE PROVIDER - NSDCMRMEDTOKEN_GEN_ALL_CORE_FT
aspirin 81 mg oral tablet: 1 tab(s) orally once a day  atorvastatin 80 mg oral tablet: 1 tab(s) orally once a day (at bedtime)  Brilinta (ticagrelor) 90 mg oral tablet: 1 tab(s) orally 2 times a day  Magnesium: 500 milligram(s) orally once a day  metoprolol succinate 50 mg oral tablet, extended release: 1 tab(s) orally once a day  nitroglycerin 0.4 mg sublingual tablet: 1 tab(s) sublingual 4 times a day, As Needed  ramipril 1.25 mg oral tablet: 1 tab(s) orally once a day  Tylenol Extra Strength 500 mg oral tablet: 1 tab(s) orally , As Needed  Tylenol PM: 1  orally , As Needed aspirin 81 mg oral tablet: 1 tab(s) orally once a day  atorvastatin 80 mg oral tablet: 1 tab(s) orally once a day (at bedtime)  Brilinta (ticagrelor) 90 mg oral tablet: 1 tab(s) orally 2 times a day  Magnesium: 500 milligram(s) orally once a day  metoprolol succinate 50 mg oral tablet, extended release: 1 tab(s) orally once a day  nitroglycerin 0.4 mg sublingual tablet: 1 tab(s) sublingual 4 times a day, As Needed  ramipril 1.25 mg oral tablet: 1 tab(s) orally once a day  Tylenol Extra Strength 500 mg oral tablet: 1 tab(s) orally , As Needed

## 2020-01-02 NOTE — PROGRESS NOTE ADULT - PROBLEM SELECTOR PLAN 6
unclear etiology ? osteomyelitis vs mild ITP  will continue to monitor  no intervention required at this time
unclear etiology ? osteomyelitis vs mild ITP  will continue to monitor  no intervention required at this time  outpatient follow up with PCP  patient advised to do so
low normal B12  will replete IM   start po B12 1000 mcg po discharge qd

## 2020-01-02 NOTE — PROGRESS NOTE ADULT - PROBLEM SELECTOR PLAN 7
likely secondary to recurrent renal stones and possibly HTN  will continue to monitor
likely secondary to recurrent renal stones and possibly HTN  will continue to monitor
unclear etiology   will continue to monitor  no intervention required at this time

## 2020-01-02 NOTE — PROGRESS NOTE ADULT - SUBJECTIVE AND OBJECTIVE BOX
Patient is a 69y old  Male who presents with a chief complaint of back pain (01 Jan 2020 14:08)    Being followed by ID for        Interval history:  No other acute events      ROS:  No cough,SOB,CP  No N/V/D  No abd pain  No urinary complaints  No HA  No joint or limb pain  No other complaints    PAST MEDICAL & SURGICAL HISTORY:  Ureteral stricture: right  Ureteral stone: right  Hyperlipemia  Hypertension  Myocardial infarction: 2/2019  S/P lumbar fusion: 10/2015, 10/2017  S/P cardiac cath: s/p 5/2000 (1), 5/2006 (1), 2/2019 (2) - total of 4 stents    Allergies    fish (Vomiting; Nausea)  No Known Drug Allergies    Intolerances      Antimicrobials:      MEDICATIONS  (STANDING):  atorvastatin 80 milliGRAM(s) Oral at bedtime  chlorhexidine 2% Cloths 1 Application(s) Topical <User Schedule>  lisinopril 5 milliGRAM(s) Oral daily  metoprolol succinate ER 50 milliGRAM(s) Oral daily      Vital Signs Last 24 Hrs  T(C): 36.4 (01-02-20 @ 07:46), Max: 36.7 (01-01-20 @ 17:37)  T(F): 97.6 (01-02-20 @ 07:46), Max: 98 (01-01-20 @ 17:37)  HR: 52 (01-02-20 @ 07:46) (52 - 63)  BP: 124/73 (01-02-20 @ 07:46) (110/65 - 129/75)  BP(mean): --  RR: 18 (01-02-20 @ 07:46) (18 - 18)  SpO2: 98% (01-02-20 @ 07:46) (96% - 99%)    Physical Exam:    Constitutional well preserved,comfortable,pleasant    HEENT PERRLA EOMI,No pallor or icterus    No oral exudate or erythema    Neck supple no JVD or LN    Chest Good AE,CTA    CVS RRR S1 S2 WNl No murmur or rub or gallop    Abd soft BS normal No tenderness no masses    Ext No cyanosis clubbing or edema    IV site no erythema tenderness or discharge    Joints no swelling or LOM    CNS AAO X 3 no focal    Lab Data:                          13.9   6.34  )-----------( 127      ( 02 Jan 2020 08:49 )             42.0       01-02    139  |  105  |  28<H>  ----------------------------<  106<H>  3.9   |  24  |  1.35<H>    Ca    9.4      02 Jan 2020 07:10      .Blood Blood  12-30-19   No growth to date.  --  --      .Urine Clean Catch (Midstream)  12-30-19   No growth  --  --                    WBC Count: 6.34 (01-02-20 @ 08:49)  WBC Count: 7.35 (01-01-20 @ 08:21)  WBC Count: 4.08 (12-31-19 @ 09:28)  WBC Count: 4.85 (12-30-19 @ 12:06) Patient is a 69y old  Male who presents with a chief complaint of back pain (01 Jan 2020 14:08)    Being followed by ID for        Interval history:  still with episodic pain in back  MRI with contrast reviewed with neuroradiology attending  No other acute events        PAST MEDICAL & SURGICAL HISTORY:  Ureteral stricture: right  Ureteral stone: right  Hyperlipemia  Hypertension  Myocardial infarction: 2/2019  S/P lumbar fusion: 10/2015, 10/2017  S/P cardiac cath: s/p 5/2000 (1), 5/2006 (1), 2/2019 (2) - total of 4 stents    Allergies    fish (Vomiting; Nausea)  No Known Drug Allergies    Intolerances      Antimicrobials:      MEDICATIONS  (STANDING):  atorvastatin 80 milliGRAM(s) Oral at bedtime  chlorhexidine 2% Cloths 1 Application(s) Topical <User Schedule>  lisinopril 5 milliGRAM(s) Oral daily  metoprolol succinate ER 50 milliGRAM(s) Oral daily      Vital Signs Last 24 Hrs  T(C): 36.4 (01-02-20 @ 07:46), Max: 36.7 (01-01-20 @ 17:37)  T(F): 97.6 (01-02-20 @ 07:46), Max: 98 (01-01-20 @ 17:37)  HR: 52 (01-02-20 @ 07:46) (52 - 63)  BP: 124/73 (01-02-20 @ 07:46) (110/65 - 129/75)  BP(mean): --  RR: 18 (01-02-20 @ 07:46) (18 - 18)  SpO2: 98% (01-02-20 @ 07:46) (96% - 99%)    Physical Exam:    Constitutional well preserved,comfortable,pleasant    HEENT PERRLA EOMI,No pallor or icterus    No oral exudate or erythema    Neck supple no JVD or LN    Chest Good AE,CTA    CVS RRR S1 S2 WNl     Abd soft BS normal No tenderness     Ext No cyanosis clubbing or edema    IV site no erythema tenderness or discharge    Joints no swelling or LOM    CNS AAO X 3 no focal    Lab Data:                          13.9   6.34  )-----------( 127      ( 02 Jan 2020 08:49 )             42.0       01-02    139  |  105  |  28<H>  ----------------------------<  106<H>  3.9   |  24  |  1.35<H>    Ca    9.4      02 Jan 2020 07:10      .Blood Blood  12-30-19   No growth to date.  --  --      .Urine Clean Catch (Midstream)  12-30-19   No growth  --  --      < from: MR Lumbar Spine w/wo IV Cont (12.31.19 @ 19:43) >  EXAM:  MR SPINE LUMBAR WAW IC                            PROCEDURE DATE:  12/31/2019            INTERPRETATION:  HISTORY: Severe back pain. Rule out osteomyelitis at L1-L2..    Description: MRI of the lumbar spine with and without gadolinium contrast was performed.     Comparison: Comparison is made to the lumbar spine regions of the prior abdomen CT studies from 12/30/2019, 09/05/2019. No prior spine MRI studies were available for comparison at this medical center.    7 cc intravenous Gadovistgadolinium contrast was administered, 0.5 cc contrast was discarded.    Sagittal T1/T2/STIR/T1 postcontrast fat saturation, and axial T1/T2/T1 postcontrast series were performed.    Posterior spinal fusion is noted at the L3-S1 levels with associatedbilateral pedicle screws, vertically oriented metallic rods, and laminectomy change. L2-L3 anterior fusion is noted with associated intervertebral disc spacer, left-sided L2 and L3 screws, with associated left vertically oriented metallic cheryl. The metallic hardware causes artifact, partially limiting evaluation.    Extensive bone marrow edema and enhancement involves the L1 and L2 vertebral bodies. Irregular disc space narrowing is noted. Paraspinal soft tissue enhancement and mild central epidural enhancement is noted at these levels. These constellation of findings are highly suggestive of discitis osteomyelitis complex. The L1-L2 disc space narrowing appears progressively narrows when comparing the 12/30/2019 and 09/05/2019 CT examinations.    There is no epidural abscess, lower thoracic spinal cord compression, or cauda equina compression.    A T12 hemangioma is present with associated increased T1 and T2 signal.    IMPRESSION:    The exam findings are highly suspicious of discitis osteomyelitis complex at the L1 and L2 levels as described. There is no associated epidural abscess, lower thoracic spinal cord compression, or cauda equina compression.                    ZAINAB NOVOA M.D., ATTENDING RADIOLOGIST  This document has beenelectronically signed. Jan 1 2020 12:09PM        < end of copied text >                WBC Count: 6.34 (01-02-20 @ 08:49)  WBC Count: 7.35 (01-01-20 @ 08:21)  WBC Count: 4.08 (12-31-19 @ 09:28)  WBC Count: 4.85 (12-30-19 @ 12:06)

## 2020-01-02 NOTE — DISCHARGE NOTE PROVIDER - CARE PROVIDER_API CALL
PMD as soon as possible,   Phone: (   )    -  Fax: (   )    -  Follow Up Time: PMD as soon as possible,   Phone: (   )    -  Fax: (   )    -  Follow Up Time:     ORTHOPEDIC SURGEON,   Phone: (   )    -  Fax: (   )    -  Follow Up Time:     PMD,   Phone: (   )    -  Fax: (   )    -  Follow Up Time:

## 2020-01-02 NOTE — DISCHARGE NOTE PROVIDER - NSDCFUSCHEDAPPT_GEN_ALL_CORE_FT
EDGAR LINDSAY ; 03/03/2020 ; [facility] EDGAR LINDSAY ; 03/03/2020 ; [facility] Urology [facility] EDGAR LINDSAY ; 03/03/2020 ; [facility] Urology [facility] EDGAR LINDSAY ; 03/03/2020 ; [facility] Urology [facility]

## 2020-01-02 NOTE — PROGRESS NOTE ADULT - PROBLEM SELECTOR PLAN 1
ID and radiology attendings have recommended FNA of affected area   arranged for 4 PM tomorrow   discussed with patient in detail  the patient has discussed with his ortho attending Dr. Matos and his family   He has decided to follow up with Dr Matos tomorrow instead of having the L1L2 FNA CT guided tomorrow   he is very well aware of the risks of leaving with a possibly infected spine including but not limited to paraplegia, dissemination of infection and possibly even death.  The patient expresses understanding and wishes to leave AMA  all explanations given in easy to understand layman's language and both patient and family express complete understanding of the risks and they still chose to leave and follow up as outpatient

## 2020-01-04 LAB
CULTURE RESULTS: SIGNIFICANT CHANGE UP
CULTURE RESULTS: SIGNIFICANT CHANGE UP
SPECIMEN SOURCE: SIGNIFICANT CHANGE UP
SPECIMEN SOURCE: SIGNIFICANT CHANGE UP

## 2020-02-21 ENCOUNTER — FORM ENCOUNTER (OUTPATIENT)
Age: 70
End: 2020-02-21

## 2020-02-22 ENCOUNTER — APPOINTMENT (OUTPATIENT)
Dept: ULTRASOUND IMAGING | Facility: IMAGING CENTER | Age: 70
End: 2020-02-22
Payer: MEDICARE

## 2020-02-22 ENCOUNTER — OUTPATIENT (OUTPATIENT)
Dept: OUTPATIENT SERVICES | Facility: HOSPITAL | Age: 70
LOS: 1 days | End: 2020-02-22
Payer: MEDICARE

## 2020-02-22 DIAGNOSIS — Z98.1 ARTHRODESIS STATUS: Chronic | ICD-10-CM

## 2020-02-22 DIAGNOSIS — Z98.890 OTHER SPECIFIED POSTPROCEDURAL STATES: Chronic | ICD-10-CM

## 2020-02-22 DIAGNOSIS — N20.0 CALCULUS OF KIDNEY: ICD-10-CM

## 2020-02-22 PROCEDURE — 76770 US EXAM ABDO BACK WALL COMP: CPT

## 2020-02-22 PROCEDURE — 76770 US EXAM ABDO BACK WALL COMP: CPT | Mod: 26

## 2020-03-03 ENCOUNTER — APPOINTMENT (OUTPATIENT)
Dept: UROLOGY | Facility: CLINIC | Age: 70
End: 2020-03-03
Payer: MEDICARE

## 2020-03-03 DIAGNOSIS — N20.0 CALCULUS OF KIDNEY: ICD-10-CM

## 2020-03-03 PROCEDURE — 99213 OFFICE O/P EST LOW 20 MIN: CPT

## 2020-03-03 NOTE — PHYSICAL EXAM
[General Appearance - Well Developed] : well developed [General Appearance - Well Nourished] : well nourished [Normal Appearance] : normal appearance [Well Groomed] : well groomed [General Appearance - In No Acute Distress] : no acute distress [] : no respiratory distress [Exaggerated Use Of Accessory Muscles For Inspiration] : no accessory muscle use [Respiration, Rhythm And Depth] : normal respiratory rhythm and effort [Oriented To Time, Place, And Person] : oriented to person, place, and time [Affect] : the affect was normal [Mood] : the mood was normal [Not Anxious] : not anxious [Normal Station and Gait] : the gait and station were normal for the patient's age

## 2020-03-03 NOTE — HISTORY OF PRESENT ILLNESS
[FreeTextEntry1] : Pt returns for metabolic evaluation and prevention of future stone disease following recent surgery for stone.  At issue today is review of the stone analysis, risk factors for future stone episodes and establishing whether they have pure dietary, metabolic, or mixed causes for their stone risks which is unrelated to the surgical management of their stone disease.\par \par They underwent right URS with stent removal, stone removal of right ureteral stone, right renal stone\par \par Stent status: none left after procedure\par \par Stone analysis: 90% Calcium oxalate monohydrate 10% Calcium oxalate dihydrate \par \par Feeling well today- other than recent back pain, spasms.\par  [None] : no symptoms

## 2020-03-03 NOTE — LETTER BODY
[Dear  ___] : Dear  [unfilled], [Consult Letter:] : I had the pleasure of evaluating your patient, [unfilled]. [( Thank you for referring [unfilled] for consultation for _____ )] : Thank you for referring [unfilled] for consultation for [unfilled] [Please see my note below.] : Please see my note below. [Consult Closing:] : Thank you very much for allowing me to participate in the care of this patient.  If you have any questions, please do not hesitate to contact me. [Sincerely,] : Sincerely, [FreeTextEntry1] : Pt returns for metabolic evaluation and prevention of future stone disease following recent surgery for stone.  At issue today is review of the stone analysis, risk factors for future stone episodes and establishing whether they have pure dietary, metabolic, or mixed causes for their stone risks which is unrelated to the surgical management of their stone disease.\par \par They underwent right URS with stent removal, stone removal of right ureteral stone, right renal stone\par \par Stent status: none left after procedure\par \par Stone analysis: 90% Calcium oxalate monohydrate 10% Calcium oxalate dihydrate \par \par Feeling well today- other than recent back pain, spasms.\par \par 24 hour urine reviewed with pt- volume very low, all else excellent, wnl.  Options for meds/supplements reviewed but diet is the key, and he's doing well, but for volume.\par \par 1. Diet modification as discussed\par 2. RTC with renal US in 6 months with renal us and full exam, or can f/u with Dr. Jasso moving forward.\par \par No surgical intervention needed at this time.\par \par Renal US 2/23/2019- no stone on right visible. No hydro.  Several 2 to 3 mm calcification on left, c/w prior CT scan- reviewed as well for comparison. [DrConnor  ___] : Dr. FOX

## 2020-09-03 ENCOUNTER — APPOINTMENT (OUTPATIENT)
Dept: UROLOGY | Facility: CLINIC | Age: 70
End: 2020-09-03

## 2021-05-04 NOTE — ED ADULT TRIAGE NOTE - ACCOMPANIED BY
Pt's wife Kandy called would like her  to see .   did a surgery on his neck a few years back and now needs his back looked at.  She would like to know if we can order the MRI.  Please advise   
RN spoke to Kandy, imaging will have to be ordered by PCP. If imaging is completed at a non-[facility] facility, Kandy was informed to bring it with the the patients appointment. Kandy verbalized understanding.     
Aide

## 2021-10-28 NOTE — ED PROVIDER NOTE - CCCP TRG CHIEF CMPLNT
Patient referred to home health following right posterolateral hip arthroplasty 10/25/2021 by Dr. Hernández and she is to follow-up 11/10/2021.  Patient is having a lot of nausea, dizziness, low blood pressure since surgery.  She reports significant fatigue and exhaustion and is worried about her strength getting up and down from a recliner and her bed.  She and  report several trips to the bathroom over night. Patient has practiced exercises prior to surgery, she was independent with activities of daily living but beginning to use a cane due to the pain.      Patient's bandage is intact, free of drainage.  She is to leave bandage intact until seen in office by MD and she was made aware.  Discussed shower transfers verbally but patient requested to not practice today.      Patient lives with , daughter and 2 year old granddaughter with 8 steps or more to enter either side of the home and one flight of steps to bedroom.  She is staying on the main floor where bathroom is and sleeping on a fold out futon.      Patient was advised of hip precautions, proper elevation, positioning and icing.    Plan for next visit:  -Continue to review pain/edema management and hip precautions  -Assess gait and improve step length,step through gait pattern with walker  -Review exercises and attempt to perform consecutively and greater than 10 reps
R. flank pain

## 2023-09-09 NOTE — ASU PATIENT PROFILE, ADULT - PT NEEDS ASSIST
DISPLAY PLAN FREE TEXT DISPLAY PLAN FREE TEXT DISPLAY PLAN FREE TEXT DISPLAY PLAN FREE TEXT DISPLAY PLAN FREE TEXT DISPLAY PLAN FREE TEXT DISPLAY PLAN FREE TEXT DISPLAY PLAN FREE TEXT DISPLAY PLAN FREE TEXT DISPLAY PLAN FREE TEXT DISPLAY PLAN FREE TEXT DISPLAY PLAN FREE TEXT DISPLAY PLAN FREE TEXT DISPLAY PLAN FREE TEXT DISPLAY PLAN FREE TEXT DISPLAY PLAN FREE TEXT DISPLAY PLAN FREE TEXT DISPLAY PLAN FREE TEXT DISPLAY PLAN FREE TEXT DISPLAY PLAN FREE TEXT DISPLAY PLAN FREE TEXT DISPLAY PLAN FREE TEXT DISPLAY PLAN FREE TEXT DISPLAY PLAN FREE TEXT DISPLAY PLAN FREE TEXT DISPLAY PLAN FREE TEXT DISPLAY PLAN FREE TEXT DISPLAY PLAN FREE TEXT DISPLAY PLAN FREE TEXT DISPLAY PLAN FREE TEXT DISPLAY PLAN FREE TEXT DISPLAY PLAN FREE TEXT DISPLAY PLAN FREE TEXT DISPLAY PLAN FREE TEXT DISPLAY PLAN FREE TEXT DISPLAY PLAN FREE TEXT DISPLAY PLAN FREE TEXT DISPLAY PLAN FREE TEXT DISPLAY PLAN FREE TEXT DISPLAY PLAN FREE TEXT DISPLAY PLAN FREE TEXT DISPLAY PLAN FREE TEXT DISPLAY PLAN FREE TEXT no

## 2023-10-06 NOTE — H&P ADULT - NSICDXPASTMEDICALHX_GEN_ALL_CORE_FT
Acute Care - Speech Language Pathology Treatment Note  [facility]     Patient Name: Kenneth Wen  : 1953  MRN: [id number]  Today's Date: 10/6/2023               Admit Date: 9/15/2023     Visit Dx:    ICD-10-CM ICD-9-CM   1. Hemorrhagic CVA  I61.9 431   2. Aphasia  R47.01 784.3   3. Dysarthria  R47.1 784.51   4. Oropharyngeal dysphagia  R13.12 787.22     Patient Active Problem List   Diagnosis    Precordial pain    Elevated BP without diagnosis of hypertension    Dyslipidemia    Hyperglycemia    Multiple bilateral CVAs    Hemorrhagic CVA    HFpEF    T2DM (type 2 diabetes mellitus)    HTN (hypertension)    GERD (gastroesophageal reflux disease)    ICH (intracerebral hemorrhage)    Oropharyngeal dysphagia     Past Medical History:   Diagnosis Date    Acid reflux     Cancer     Skin    Diabetes mellitus     Prediabetes    GERD (gastroesophageal reflux disease) 09/15/2023    HTN (hypertension) 09/15/2023    Kidney disease     T2DM (type 2 diabetes mellitus) 09/15/2023     Past Surgical History:   Procedure Laterality Date    APPENDECTOMY      ENDOSCOPY W/ PEG TUBE PLACEMENT N/A 2023    Procedure: ESOPHAGOGASTRODUODENOSCOPY WITH PERCUTANEOUS ENDOSCOPIC GASTROSTOMY TUBE INSERTION;  Surgeon: Haseeb Carrillo MD;  Location: [facility] ENDOSCOPY;  Service: General;  Laterality: N/A;    HEMORRHOIDECTOMY      NASAL POLYP SURGERY         SLP Recommendation and Plan                                   Daily Summary of Progress (SLP): progress towards functional goals is fair (10/06/23 1530)           Treatment Assessment (SLP): continued, moderate, oral dysphagia, pharyngeal dysphagia, aphasia, dysarthria, cognitive-linguistic disorder (10/06/23 1530)  Treatment Assessment Comments (SLP): Pt motivated and participated well in tx. Following commands with greater accuracy. Expressive language skills improving. Continues with hard cough response with thin liquid, no overt s/sx aspiration with pruee or nectar-thick  liquids. Oral phase continues to be a challenge in bolus acceptance, preparation, and oral clearance. (10/06/23 1530)  Plan for Continued Treatment (SLP): continue treatment per plan of care (10/06/23 1530)  Plan of Care Reviewed With: patient, spouse (10/06/23 8158)      SLP EVALUATION (last 72 hours)       SLP SLC Evaluation       Row Name 10/06/23 1530                   Communication Assessment/Intervention    Document Type therapy note (daily note)  -DV        Subjective Information no complaints  -DV        Patient Observations alert;cooperative  -DV        Patient/Family/Caregiver Comments/Observations spouse present  -DV        Patient Effort good  -DV        Symptoms Noted During/After Treatment none  -DV           SLP Treatment Clinical Impressions    Treatment Assessment (SLP) continued;moderate;oral dysphagia;pharyngeal dysphagia;aphasia;dysarthria;cognitive-linguistic disorder  -DV        Treatment Assessment Comments (SLP) Pt motivated and participated well in tx. Following commands with greater accuracy. Expressive language skills improving. Continues with hard cough response with thin liquid, no overt s/sx aspiration with pruee or nectar-thick liquids. Oral phase continues to be a challenge in bolus acceptance, preparation, and oral clearance.  -DV        Daily Summary of Progress (SLP) progress towards functional goals is fair  -DV        Barriers to Overall Progress (SLP) Cognitive status  -DV        Plan for Continued Treatment (SLP) continue treatment per plan of care  -DV        Care Plan Review evaluation/treatment results reviewed;care plan/treatment goals reviewed;risks/benefits reviewed;current/potential barriers reviewed;patient/other agree to care plan  -DV        Care Plan Review, Other Participant(s) spouse  -DV                  User Key  (r) = Recorded By, (t) = Taken By, (c) = Cosigned By      Initials Name Effective Dates    Shannon Carpio MS CCC-SLP 06/16/21 -                         EDUCATION  The patient has been educated in the following areas:     Cognitive Impairment Communication Impairment Dysphagia (Swallowing Impairment) Modified Diet Instruction.           SLP GOALS       Row Name 10/06/23 1530 10/05/23 1010 10/04/23 1115       (LTG) Patient will demonstrate functional swallow for    Diet Texture (Demonstrate functional swallow) soft to chew (chopped) textures  -DV soft to chew (chopped) textures  -AC soft to chew (chopped) textures  -MP    Liquid viscosity (Demonstrate functional swallow) nectar/ mildly thick liquids  -DV nectar/ mildly thick liquids  -AC nectar/ mildly thick liquids  -MP    [place] (Demonstrate functional swallow) with minimal cues (75-90% accuracy)  -DV with minimal cues (75-90% accuracy)  -AC with minimal cues (75-90% accuracy)  -MP    Time Frame (Demonstrate functional swallow) by discharge  -DV by discharge  -AC by discharge  -MP    Barriers (Demonstrate functional swallow) -- Lethargy  -AC --    Progress/Outcomes (Demonstrate functional swallow) continuing progress toward goal  -DV continuing progress toward goal  -AC goal ongoing  -MP    Comment (Demonstrate functional swallow) -- Reviewed results of MBS, dysphagia dx, aspiration risks, & recommendations. Pt's spouse stated understanding.  -AC --       (STG) Patient will tolerate trials of    Consistencies Trialed (Tolerate trials) pureed textures;honey/ moderately thick liquids  -DV pureed textures;honey/ moderately thick liquids  -AC pureed textures;honey/ moderately thick liquids  -MP    Desired Outcome (Tolerate trials) without signs/symptoms of aspiration;without signs of distress;with adequate oral prep/transit/clearance;with use of compensatory strategies (see comments)  -DV without signs/symptoms of aspiration;without signs of distress;with adequate oral prep/transit/clearance;with use of compensatory strategies (see comments)  alternate bites/sips  -AC without signs/symptoms of  aspiration;without signs of distress;with adequate oral prep/transit/clearance;with use of compensatory strategies (see comments)  alternate b/w bites & sips  -MP    [place] (Tolerate trials) with 1:1 assist/ supervision  -DV with 1:1 assist/ supervision  -AC with 1:1 assist/ supervision  -MP    Time Frame (Tolerate trials) by discharge  -DV by discharge  -AC by discharge  -MP    Progress/Outcomes (Tolerate trials) continuing progress toward goal  -DV continuing progress toward goal  -AC goal revised this date  -MP    Comment (Tolerate trials) tolerated puree and honey liquids with no s/sx aspiration. continues with significant oral deficits  -DV Tolerated honey via tsp/cup w/o s/sxs aspiration. Better labial seal and more timely oral transit when taking liquid via tsp. Pt declined puree trials.  -AC --       (STG) Patient will tolerate therapeutic trials of    Consistencies Trialed (Tolerate therapeutic trials) thin liquids  -DV thin liquids  -AC thin liquids  -MP    Desired Outcome (Tolerate therapeutic trials) without signs/symptoms of aspiration;without signs of distress  -DV without signs/symptoms of aspiration;without signs of distress  -AC without signs/symptoms of aspiration;without signs of distress  -MP    [place] (Tolerate therapeutic trials) with minimal cues (75-90% accuracy)  -DV with minimal cues (75-90% accuracy)  -AC with minimal cues (75-90% accuracy)  -MP    Time Frame (Tolerate therapeutic trials) by discharge  -DV by discharge  -AC by discharge  -MP    Progress/Outcomes (Tolerate therapeutic trials) continuing progress toward goal  -DV goal ongoing  -AC goal revised this date  -MP    Comment (Tolerate therapeutic trials) overt cough resposne  -DV DNT 2' increased lethargy as session progressed.  -AC --       (STG) Labial Strengthening Goal 1 (SLP)    Activity (Labial Strengthening Goal 1, SLP) increase labial tone  -DV -- increase labial tone  -MP    Increase Labial Tone labial  resistance exercises;swallow trials  -DV labial resistance exercises;swallow trials  -AC labial resistance exercises;swallow trials  -MP    [place]/Accuracy (Labial Strengthening Goal 1, SLP) with moderate cues (50-74% accuracy)  -DV with moderate cues (50-74% accuracy)  -AC with moderate cues (50-74% accuracy)  -MP    Time Frame (Labial Strengthening Goal 1, SLP) short term goal (STG)  -DV short term goal (STG)  -AC short term goal (STG)  -MP    Progress/Outcomes (Labial Strengthening Goal 1, SLP) goal ongoing  -DV goal ongoing  -AC goal ongoing  -MP       (STG) Lingual Strengthening Goal 1 (SLP)    Activity (Lingual Strengthening Goal 1, SLP) increase lingual tone/sensation/control/coordination/movement;increase tongue back strength  -DV -- increase lingual tone/sensation/control/coordination/movement;increase tongue back strength  -MP    Increase Lingual Tone/Sensation/Control/Coordination/Movement swallow trials;lingual resistance exercises  -DV swallow trials;lingual resistance exercises  -AC swallow trials;lingual resistance exercises  -MP    Increase Tongue Back Strength lingual resistance exercises  -DV lingual resistance exercises  -AC lingual resistance exercises  -MP    [place]/Accuracy (Lingual Strengthening Goal 1, SLP) with moderate cues (50-74% accuracy)  -DV with moderate cues (50-74% accuracy)  -AC with moderate cues (50-74% accuracy)  -MP    Time Frame (Lingual Strengthening Goal 1, SLP) short term goal (STG)  -DV short term goal (STG)  -AC short term goal (STG)  -MP    Progress/Outcomes (Lingual Strengthening Goal 1, SLP) goal ongoing  -DV goal ongoing  -AC goal ongoing  -MP       (STG) Pharyngeal Strengthening Exercise Goal 1 (SLP)    Activity (Pharyngeal Strengthening Goal 1, SLP) increase superior movement of the hyolaryngeal complex;increase anterior movement of the hyolaryngeal complex;increase closure at entrance to airway/closure of airway at glottis;increase squeeze/positive  pressure generation;increase tongue base retraction;increase timing  -DV -- increase superior movement of the hyolaryngeal complex;increase anterior movement of the hyolaryngeal complex;increase closure at entrance to airway/closure of airway at glottis;increase squeeze/positive pressure generation;increase tongue base retraction;increase timing  -MP    Increase Timing prepping - 3 second prep or suck swallow or 3-step swallow  -DV prepping - 3 second prep or suck swallow or 3-step swallow  -AC prepping - 3 second prep or suck swallow or 3-step swallow  -MP    Increase Superior Movement of the Hyolaryngeal Complex effortful pitch glide (falsetto + pharyngeal squeeze)  -DV effortful pitch glide (falsetto + pharyngeal squeeze)  -AC effortful pitch glide (falsetto + pharyngeal squeeze)  -MP    Increase Anterior Movement of the Hyolaryngeal Complex chin tuck against resistance (CTAR)  -DV chin tuck against resistance (CTAR)  -AC chin tuck against resistance (CTAR)  -MP    Increase Closure at Entrance to Airway/Closure of Airway at Glottis supraglottic swallow  -DV supraglottic swallow  -AC supraglottic swallow  -MP    Increase Squeeze/Positive Pressure Generation hard effortful swallow  -DV hard effortful swallow  -AC hard effortful swallow  -MP    Increase Tongue Base Retraction tamie  -DV tamie  -AC tamie  -MP    [place]/Accuracy (Pharyngeal Strengthening Goal 1, SLP) with moderate cues (50-74% accuracy)  -DV with moderate cues (50-74% accuracy)  -AC with moderate cues (50-74% accuracy)  -MP    Time Frame (Pharyngeal Strengthening Goal 1, SLP) short term goal (STG)  -DV short term goal (STG)  -AC short term goal (STG)  -MP    Progress/Outcomes (Pharyngeal Strengthening Goal 1, SLP) continuing progress toward goal  -DV goal ongoing  -AC goal revised this date  -MP    Comment (Pharyngeal Strengthening Goal 1, SLP) completed CTAR, falsetto, and effortful  -DV -- --       Patient will demonstrate functional  communication skills for return to discharge environment     [place] with moderate cues  -DV with moderate cues  -AC --    Time frame by discharge  -DV by discharge  -AC --    Progress/Outcomes continuing progress toward goal  -DV continuing progress toward goal  -AC --    Comments Motivated and tries to do what he can.  -DV -- --       Comprehend Questions Goal 1 (SLP)    Improve Ability to Comprehend Questions Goal 1 (SLP) complex yes/no questions;80%;with minimal cues (75-90%)  -DV complex yes/no questions;80%;with minimal cues (75-90%)  -AC --    Time Frame (Comprehend Questions Goal 1, SLP) short term goal (STG)  -DV short term goal (STG)  -AC --    Progress (Ability to Comprehend Questions Goal 1, SLP) 70%;with minimal cues (75-90%)  -DV 60%;with minimal cues (75-90%)  -AC --    Progress/Outcomes (Comprehend Questions Goal 1, SLP) good progress toward goal  -DV good progress toward goal  -AC --       Follow Directions Goal 2 (SLP)    Improve Ability to Follow Directions Goal 1 (SLP) 2 step commands;80%;with minimal cues (75-90%)  -DV 2 step commands;80%;with minimal cues (75-90%)  -AC --    Time Frame (Follow Directions Goal 1, SLP) short term goal (STG)  -DV short term goal (STG)  -AC --    Progress (Ability to Follow Directions Goal 1, SLP) 70%;with minimal cues (75-90%)  -DV 0%;with minimal cues (75-90%)  -AC --    Progress/Outcomes (Follow Directions Goal 1, SLP) continuing progress toward goal  -DV progress slower than expected  -AC --       Word Retrieval Skills Goal 1 (SLP)    Improve Word Retrieval Skills By Goal 1 (SLP) confrontational naming task;high frequency;responsive naming task;simple;80%;with minimal cues (75-90%)  -DV confrontational naming task;high frequency;responsive naming task;simple;80%;with minimal cues (75-90%)  -AC --    Time Frame (Word Retrieval Goal 1, SLP) short term goal (STG)  -DV short term goal (STG)  -AC --    Progress (Word Retrieval Skills Goal 1, SLP) 70%;100%;with  minimal cues (75-90%)  -DV -- --    Progress/Outcomes (Word Retrieval Goal 1, SLP) continuing progress toward goal  -DV goal ongoing  -AC --    Comment (Word Retrieval Goal 1, SLP) responsive 70%, automatic 100%  -DV -- --       Articulation Goal 1 (SLP)    Improve Articulation Goal 1 (SLP) by over-articulating at phrase level;80%;with moderate cues (50-74%)  -DV by over-articulating at phrase level;80%;with moderate cues (50-74%)  -AC --    Time Frame (Articulation Goal 1, SLP) short term goal (STG)  -DV short term goal (STG)  -AC --    Progress (Articulation Goal 1, SLP) -- 10%;with moderate cues (50-74%)  -AC --    Progress/Outcomes (Articulation Goal 1, SLP) goal ongoing  -DV progress slower than expected  -AC --       Orientation Goal 1 (SLP)    Improve Orientation Through Goal 1 (SLP) demonstrating orientation to day;demonstrating orientation to month;demonstrating orientation to year;demonstrating orientation to place;demonstrating orientation to disease/impairment;use environmental aids to assist with orientation;80%;with moderate cues (50-74%)  -DV demonstrating orientation to day;demonstrating orientation to month;demonstrating orientation to year;demonstrating orientation to place;demonstrating orientation to disease/impairment;use environmental aids to assist with orientation;80%;with moderate cues (50-74%)  -AC --    Time Frame (Orientation Goal 1, SLP) short term goal (STG)  -DV short term goal (STG)  -AC --    Progress (Orientation Goal 1, SLP) -- 20%;with moderate cues (50-74%)  -AC --    Progress/Outcomes (Orientation Goal 1, SLP) goal ongoing  -DV continuing progress toward goal  -AC --              User Key  (r) = Recorded By, (t) = Taken By, (c) = Cosigned By      Initials Name Provider Type    AC Rosa Maria Mercado, MS CCC-SLP Speech and Language Pathologist    Aldo Snowden, MS CCC-SLP Speech and Language Pathologist    Shannon Carpio, MS CCC-SLP Speech and Language Pathologist                             Time Calculation:      Time Calculation- SLP       Row Name 10/06/23 1636             Time Calculation- SLP    SLP Start Time 1530  -DV      SLP Received On 10/06/23  -DV         Untimed Charges    SLP Treatment ST Treatment Swallow Minutes  - 48412;ST Treatment/ST Modification Prosth Aug Alter-17845  -DV      41614-DG Treatment/ST Modification Prosth Aug Alter  30  -DV      44445-EV Treatment Swallow Minutes 30  -DV         Total Minutes    Untimed Charges Total Minutes 60  -DV       Total Minutes 60  -DV                User Key  (r) = Recorded By, (t) = Taken By, (c) = Cosigned By      Initials Name Provider Type    DV Shannon Zhang MS CCC-SLP Speech and Language Pathologist                    Therapy Charges for Today       Code Description Service Date Service Provider Modifiers Qty    35478014214 HC ST TREATMENT SWALLOW 2 10/6/2023 Shannon Zhang MS CCC-SLP GN 1    [id number] HC ST TREATMENT SPEECH 2 10/6/2023 Shannon Zhang MS CCC-NIELS GN 1                       MS SRUTHI Hein  10/6/2023   PAST MEDICAL HISTORY:  Hyperlipemia     Hypertension     Myocardial infarction 2/2019    Ureteral stone right    Ureteral stricture right

## 2024-12-07 NOTE — ASU PREOP CHECKLIST - INTERNAL PROSTHESES
Goal Outcome Evaluation:  Plan of Care Reviewed With: patient   Will continue with plan of care.     Problem: Adult Inpatient Plan of Care  Goal: Plan of Care Review  Outcome: Progressing  Plan of Care Reviewed With: patient      yes(specify)

## 2025-02-18 NOTE — ASU PREOP CHECKLIST - TAMPON REMOVED
Pt flu+ since Thursday and parent reports difficulty controlling fevers at home. Pt mother states his fever was 102-104 F after tylenol and motrin. Pt denies any pain currently. Temperature currently 99.3 F in triage.    n/a

## 2025-04-30 NOTE — ED PROVIDER NOTE - GASTROINTESTINAL NEGATIVE STATEMENT, MLM
no
+Right flank pain radiating to right side of abdomen, no bloating, no constipation, no diarrhea, no nausea and no vomiting.

## 2025-07-11 NOTE — DISCHARGE NOTE NURSING/CASE MANAGEMENT/SOCIAL WORK - NSTOBACCONEVERSMOKERY/N_GEN_A
Pt to clinic for CIV d/c.  5FU pump empty. Port aspirated, positive blood return noted. Port flushed with 20mL NS and Heparin 500units, then de-accessed.  Pt discharged without issue. Inbasket sent to scheduling for additional appts and pt aware he should be contacted.   
No